# Patient Record
Sex: FEMALE | Race: WHITE | NOT HISPANIC OR LATINO | Employment: FULL TIME | URBAN - METROPOLITAN AREA
[De-identification: names, ages, dates, MRNs, and addresses within clinical notes are randomized per-mention and may not be internally consistent; named-entity substitution may affect disease eponyms.]

---

## 2023-04-11 ENCOUNTER — TELEMEDICINE (OUTPATIENT)
Dept: PRIMARY CARE | Facility: CLINIC | Age: 53
End: 2023-04-11
Payer: COMMERCIAL

## 2023-04-11 VITALS — TEMPERATURE: 100.4 F

## 2023-04-11 DIAGNOSIS — U07.1 COVID-19: Primary | ICD-10-CM

## 2023-04-11 LAB — SARS-COV-2 RESULT: DETECTED

## 2023-04-11 PROCEDURE — 99213 OFFICE O/P EST LOW 20 MIN: CPT | Performed by: NURSE PRACTITIONER

## 2023-04-11 RX ORDER — PHENAZOPYRIDINE HYDROCHLORIDE 100 MG/1
1 TABLET, FILM COATED ORAL AS NEEDED
COMMUNITY
Start: 2022-11-17

## 2023-04-11 RX ORDER — TURMERIC ROOT EXTRACT 500 MG
TABLET ORAL
COMMUNITY
Start: 2021-09-14

## 2023-04-11 RX ORDER — MAGNESIUM GLUCONATE 27.5 (500)
TABLET ORAL DAILY
COMMUNITY
Start: 2021-09-14

## 2023-04-11 RX ORDER — LEVOTHYROXINE SODIUM 137 UG/1
TABLET ORAL
COMMUNITY
End: 2023-10-16 | Stop reason: SDUPTHER

## 2023-04-11 RX ORDER — TIZANIDINE 4 MG/1
TABLET ORAL AS NEEDED
COMMUNITY
Start: 2022-10-03 | End: 2023-10-16 | Stop reason: SDUPTHER

## 2023-04-11 RX ORDER — ACETAMINOPHEN 500 MG
TABLET ORAL
COMMUNITY
Start: 2020-06-11

## 2023-04-11 NOTE — PROGRESS NOTES
An interactive audio and video telecommunication system which permits real time communications between the patient (at the originating site) and provider (at the distant site) was utilized to provide this telehealth service.  Verbal consent was requested and obtained from the patient for this telehealth visit.     Subjective   Patient ID: Sherron Valdez is a 52 y.o. female who presents for sick visit.  Symptom onset 4/9/23  Covid today 4/11/23  Went to [facility] for employee testing  Has not had any covid vaccines  Has had covid before not sure of dates  Sx  Sneezing ,coughing  watery eyes   Now body aches sore throat  Ear pain fever chills  sneezing  using  Advil cold and sinus  Allegra    Review of Systems  ROS completely negative except what was mentioned in the HPI.  Problem List, surgical, social, and family histories which were reviewed and updated as necessary within the EMR. I also personally reviewed the notes, labs, and imaging that pertained to what was documented or discussed in the HPI.      Objective   Physical Exam  Vitals and nursing note reviewed.   Constitutional:       Appearance: Normal appearance.   HENT:      Head: Normocephalic and atraumatic.      Right Ear: External ear normal.      Left Ear: External ear normal.      Nose: Nose normal.      Mouth/Throat:      Mouth: Mucous membranes are moist.   Eyes:      Extraocular Movements: Extraocular movements intact.      Conjunctiva/sclera: Conjunctivae normal.   Pulmonary:      Effort: Pulmonary effort is normal.   Musculoskeletal:      Cervical back: Normal range of motion and neck supple.   Neurological:      General: No focal deficit present.      Mental Status: She is alert and oriented to person, place, and time. Mental status is at baseline.   Psychiatric:         Mood and Affect: Mood normal.         Behavior: Behavior normal.         Thought Content: Thought content normal.         Judgment: Judgment normal.         Temp 38 °C (100.4  °F)     Assessment/Plan   Problem List Items Addressed This Visit       COVID-19 - Primary     Discussed risks and benefits to antivirals  Pt declines at this time  She desires to continue with symptomatic care  Discussed s/s that would warrant further evaluation

## 2023-04-12 PROBLEM — U07.1 COVID-19: Status: ACTIVE | Noted: 2023-04-12

## 2023-04-12 PROBLEM — Z15.09 BRCA1 POSITIVE: Status: ACTIVE | Noted: 2023-04-12

## 2023-04-12 PROBLEM — M16.12 OSTEOARTHRITIS OF LEFT HIP: Status: ACTIVE | Noted: 2023-04-12

## 2023-04-12 PROBLEM — E28.319 PREMATURE MENOPAUSE: Status: ACTIVE | Noted: 2023-04-12

## 2023-04-12 PROBLEM — M43.07 SPONDYLOLYSIS, LUMBOSACRAL REGION: Status: ACTIVE | Noted: 2023-04-12

## 2023-04-12 PROBLEM — R31.21 ASYMPTOMATIC MICROSCOPIC HEMATURIA: Status: ACTIVE | Noted: 2023-04-12

## 2023-04-12 PROBLEM — M54.6 THORACIC SPINE PAIN: Status: ACTIVE | Noted: 2023-04-12

## 2023-04-12 PROBLEM — F51.04 CHRONIC INSOMNIA: Status: ACTIVE | Noted: 2023-04-12

## 2023-04-12 PROBLEM — E55.9 VITAMIN D DEFICIENCY: Status: ACTIVE | Noted: 2023-04-12

## 2023-04-12 PROBLEM — K57.30 DIVERTICULOSIS OF COLON: Status: ACTIVE | Noted: 2023-04-12

## 2023-04-12 PROBLEM — Z98.890 S/P BREAST RECONSTRUCTION: Status: ACTIVE | Noted: 2023-04-12

## 2023-04-12 PROBLEM — Z90.13 ACQUIRED ABSENCE OF BREAST AND ABSENT NIPPLE, BILATERAL: Status: ACTIVE | Noted: 2023-04-12

## 2023-04-12 PROBLEM — Z86.0100 HISTORY OF COLON POLYPS: Status: ACTIVE | Noted: 2023-04-12

## 2023-04-12 PROBLEM — K21.9 GERD (GASTROESOPHAGEAL REFLUX DISEASE): Status: ACTIVE | Noted: 2023-04-12

## 2023-04-12 PROBLEM — E78.2 HYPERLIPIDEMIA, MIXED: Status: ACTIVE | Noted: 2023-04-12

## 2023-04-12 PROBLEM — Z15.01 BRCA1 POSITIVE: Status: ACTIVE | Noted: 2023-04-12

## 2023-04-12 PROBLEM — K64.0 GRADE I HEMORRHOIDS: Status: ACTIVE | Noted: 2023-04-12

## 2023-04-12 PROBLEM — I97.2 POSTMASTECTOMY LYMPHEDEMA SYNDROME OF LEFT UPPER EXTREMITY: Status: ACTIVE | Noted: 2023-04-12

## 2023-04-12 PROBLEM — M47.816 LUMBAR SPONDYLOSIS: Status: ACTIVE | Noted: 2023-04-12

## 2023-04-12 PROBLEM — Z85.43 H/O OVARIAN CANCER: Status: ACTIVE | Noted: 2023-04-12

## 2023-04-12 PROBLEM — E03.9 HYPOTHYROIDISM: Status: ACTIVE | Noted: 2023-04-12

## 2023-04-12 PROBLEM — N95.2 POSTMENOPAUSAL ATROPHIC VAGINITIS: Status: ACTIVE | Noted: 2023-04-12

## 2023-04-12 PROBLEM — Z85.3 HISTORY OF BREAST CANCER: Status: ACTIVE | Noted: 2023-04-12

## 2023-04-12 PROBLEM — Z86.010 HISTORY OF COLON POLYPS: Status: ACTIVE | Noted: 2023-04-12

## 2023-04-12 NOTE — ASSESSMENT & PLAN NOTE
Discussed risks and benefits to antivirals  Pt declines at this time  She desires to continue with symptomatic care  Discussed s/s that would warrant further evaluation

## 2023-05-11 PROBLEM — I71.21 ANEURYSM OF ASCENDING AORTA WITHOUT RUPTURE (CMS-HCC): Status: ACTIVE | Noted: 2023-05-11

## 2023-05-11 PROBLEM — R93.1 ABNORMAL SCREENING CARDIAC CT: Status: ACTIVE | Noted: 2023-05-11

## 2023-05-11 PROBLEM — K76.89 HEPATIC CYST: Status: ACTIVE | Noted: 2023-05-11

## 2023-06-02 PROBLEM — Z85.40 HISTORY OF PRIMARY MALIGNANT NEOPLASM OF FEMALE GENITAL ORGAN: Status: ACTIVE | Noted: 2023-04-12

## 2023-06-02 PROBLEM — N39.0 RECURRENT URINARY TRACT INFECTION: Status: ACTIVE | Noted: 2023-06-02

## 2023-06-02 PROBLEM — Z00.00 ROUTINE ADULT HEALTH MAINTENANCE: Status: ACTIVE | Noted: 2023-06-02

## 2023-06-02 PROBLEM — M43.00 SPONDYLOLYSIS: Status: ACTIVE | Noted: 2023-04-12

## 2023-06-05 PROBLEM — N28.1 KIDNEY CYST, ACQUIRED: Status: ACTIVE | Noted: 2023-06-05

## 2023-06-05 PROBLEM — M54.6 THORACIC SPINE PAIN: Status: RESOLVED | Noted: 2023-04-12 | Resolved: 2023-06-05

## 2023-10-16 ENCOUNTER — OFFICE VISIT (OUTPATIENT)
Dept: PRIMARY CARE | Facility: CLINIC | Age: 53
End: 2023-10-16
Payer: COMMERCIAL

## 2023-10-16 VITALS
HEIGHT: 61 IN | TEMPERATURE: 97.9 F | OXYGEN SATURATION: 97 % | HEART RATE: 78 BPM | BODY MASS INDEX: 31.64 KG/M2 | DIASTOLIC BLOOD PRESSURE: 86 MMHG | SYSTOLIC BLOOD PRESSURE: 130 MMHG | WEIGHT: 167.6 LBS

## 2023-10-16 DIAGNOSIS — I71.21 ANEURYSM OF ASCENDING AORTA WITHOUT RUPTURE (CMS-HCC): ICD-10-CM

## 2023-10-16 DIAGNOSIS — Z85.43 HISTORY OF OVARIAN CANCER: ICD-10-CM

## 2023-10-16 DIAGNOSIS — M43.00 SPONDYLOLYSIS: ICD-10-CM

## 2023-10-16 DIAGNOSIS — Z00.00 ROUTINE ADULT HEALTH MAINTENANCE: Primary | ICD-10-CM

## 2023-10-16 DIAGNOSIS — E55.9 VITAMIN D DEFICIENCY: ICD-10-CM

## 2023-10-16 DIAGNOSIS — M47.816 SPONDYLOSIS OF LUMBAR SPINE: ICD-10-CM

## 2023-10-16 DIAGNOSIS — E03.9 HYPOTHYROIDISM, UNSPECIFIED TYPE: ICD-10-CM

## 2023-10-16 DIAGNOSIS — M16.12 PRIMARY OSTEOARTHRITIS OF LEFT HIP: ICD-10-CM

## 2023-10-16 PROBLEM — Z98.890 S/P BREAST RECONSTRUCTION: Status: RESOLVED | Noted: 2023-04-12 | Resolved: 2023-10-16

## 2023-10-16 PROBLEM — N39.0 RECURRENT URINARY TRACT INFECTION: Status: RESOLVED | Noted: 2023-06-02 | Resolved: 2023-10-16

## 2023-10-16 PROBLEM — Z90.13 ACQUIRED ABSENCE OF BREAST AND ABSENT NIPPLE, BILATERAL: Status: RESOLVED | Noted: 2023-04-12 | Resolved: 2023-10-16

## 2023-10-16 PROBLEM — U07.1 COVID-19: Status: RESOLVED | Noted: 2023-04-12 | Resolved: 2023-10-16

## 2023-10-16 PROCEDURE — 90471 IMMUNIZATION ADMIN: CPT | Performed by: INTERNAL MEDICINE

## 2023-10-16 PROCEDURE — 90750 HZV VACC RECOMBINANT IM: CPT | Performed by: INTERNAL MEDICINE

## 2023-10-16 PROCEDURE — 99396 PREV VISIT EST AGE 40-64: CPT | Performed by: INTERNAL MEDICINE

## 2023-10-16 PROCEDURE — 1036F TOBACCO NON-USER: CPT | Performed by: INTERNAL MEDICINE

## 2023-10-16 RX ORDER — TIZANIDINE 4 MG/1
4 TABLET ORAL EVERY 8 HOURS PRN
Qty: 90 TABLET | Refills: 1 | Status: SHIPPED | OUTPATIENT
Start: 2023-10-16 | End: 2023-11-18

## 2023-10-16 RX ORDER — LEVOTHYROXINE SODIUM 137 UG/1
TABLET ORAL
Qty: 90 TABLET | Refills: 3 | Status: SHIPPED | OUTPATIENT
Start: 2023-10-16 | End: 2024-05-15

## 2023-10-16 RX ORDER — IBUPROFEN 800 MG/1
800 TABLET ORAL DAILY PRN
COMMUNITY

## 2023-10-16 ASSESSMENT — PATIENT HEALTH QUESTIONNAIRE - PHQ9
1. LITTLE INTEREST OR PLEASURE IN DOING THINGS: NOT AT ALL
SUM OF ALL RESPONSES TO PHQ9 QUESTIONS 1 AND 2: 0
2. FEELING DOWN, DEPRESSED OR HOPELESS: NOT AT ALL

## 2023-10-16 NOTE — PROGRESS NOTES
Reason for Visit: Annual Physical Exam    Assessment and Plan:  Problem List Items Addressed This Visit          High    Routine adult health maintenance - Primary    Overview     Influenza 10/1/20, declined further vaccine  TDAP 8/14/2020  Mamm 8/28/18; 5/21/20, s/p bilateral mastectomies  s/p TAHBSO  BMD 4/7/16  Cscope 12/21/14 (5yrs); 12/20 (5yrs)  MMR 3/13/19   2017 (8)         Current Assessment & Plan     Annual Wellness exam completed   Preventive Health history reviewed:  Labs ordered    Shingrix # 1           Relevant Orders    Urinalysis with Reflex Microscopic    Comprehensive Metabolic Panel    CBC and Auto Differential    Lipid Panel       Medium    Aneurysm of ascending aorta without rupture (CMS/HCC)    Overview     5/23: CT calcium score: 4.1 cm ascending aortic aneurysm.         Current Assessment & Plan     ECHO in 6months         Relevant Orders    Transthoracic Echo (TTE) Complete    History of ovarian cancer    Overview     Comment on above: Dx in 2010, stage 2 s/p TAHBSO, chemo x 6monthsDr Zanotti;         Relevant Orders        Hypothyroidism    Overview     Comment on above: Hashimoto's, dx in 1996Goal TSH 1-2She had low TSH but normal T4dose not changedOn Synthroid;  Hashimoto's, dx in 1996Goal TSH 1-2She had low TSH but normal T4dose not changedOn Synthroid (supratherapeutic on 137mcg daily);         Relevant Medications    Synthroid 137 mcg tablet    Other Relevant Orders    Transthoracic Echo (TTE) Complete    TSH with reflex to Free T4 if abnormal    Osteoarthritis of left hip    Overview     Comment on above: 2021: XR: Severe left hip osteoarthritis;         Relevant Orders    Referral to Orthopaedic Surgery    Referral to Physical Therapy    Spondylolysis    Overview     Comment on above: XR 2021: Mild anterolisthesis L5 on S1 with findings concerning forspondylolysis at L5 bilaterally;         Relevant Orders    MR lumbar spine wo IV contrast    Referral to Physical  "Therapy    Spondylosis of lumbar spine    Overview     Comment on above: MRI 4/7/16: Impression:1. Bilateral pars interarticularis defects as well as grade Ispondylolisthesis at the L5-S1 level2. Shallow central disc herniation at the L4-5 level.;  MRI 4/7/16: Impression:1. Bilateral pars interarticularis defects as well as grade I spondylolisthesis at the L5-S1 level2. Shallow central disc herniation at the L4-5 level.XR 9/21: Mild anterolisthesis L5 on S1 with findings concerning for spondylolysis at L5 bilaterally;  MRI 4/7/16: 1. Bilateral pars interarticularis defects as well as grade I spondylolisthesis at the L5-S1 level2. Shallow central disc herniation at the L4-5 level.XR 9/21: Mild anterolisthesis L5 on S1 with findings concerning for spondylolysis at L5 bilaterally;         Current Assessment & Plan     Will get imaging  May recommend PT vs Ali Young         Relevant Medications    tiZANidine (Zanaflex) 4 mg tablet    Other Relevant Orders    MR lumbar spine wo IV contrast    Referral to Physical Therapy    Vitamin D deficiency    Overview     Comment on above: Goal 40-50;  Goal 40-50started on supplement 10/21;         Relevant Orders    Vitamin D 25-Hydroxy,Total (for eval of Vitamin D levels)     HPI: Zanaflex helps her back  Didn't do imaging that was ordered ;last year  Didn't see Ortho  Hip still bother hers  Occ feels like gives way  XR last year showed severe OA    Still has pain in her left flank too  Hemp oil helping    ROS otherwise negative aside from what was mentioned above in HPI.    Vitals  /86   Pulse 78   Temp 36.6 °C (97.9 °F)   Ht 1.549 m (5' 1\")   Wt 76 kg (167 lb 9.6 oz)   SpO2 97%   BMI 31.67 kg/m²   Body mass index is 31.67 kg/m².  Physical Exam  Gen: Alert, NAD  HEENT:  Normal exam  Neck:  No masses/nodes palpable; Thyroid nontender and without nodules; No DAFNE  Respiratory:  Lungs CTAB  CV: RRR  Neuro:  Gross motor and sensory intact  Skin:  No suspicious lesions " present  Breast: No masses or axillary lymphadenopathy  Back : Tender over the left SIJ and LQ, Neg SLR  No CVA tenderness    Active Problem List  Patient Active Problem List   Diagnosis    Asymptomatic microscopic hematuria    BRCA1 positive    Chronic insomnia    Diverticulosis of colon    GERD (gastroesophageal reflux disease)    Grade I hemorrhoids    History of ovarian cancer    History of primary malignant neoplasm of female genital organ    History of colon polyps    Mixed hyperlipidemia    Hypothyroidism    Spondylosis of lumbar spine    Osteoarthritis of left hip    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 Coronary artery calcium score of 33.78*. 2. 4.1 cm ascending aortic aneurysm. 3. 2.7 cm probable cyst in the dome of the liver incidentally noted    H/O bone density study     4/7/16: normal 5/20: normal    H/O CT scan of abdomen     2011: The right hepatic lobe demonstrates a cyst measuring 4.6 cm, unchanged from multiple prior studies including CT abdomen from 714 2009. There are hypoattenuating lesions within both kidney cortices, most compatible with cysts stable from multiple prior studies, largest within upper pole right kidney measuring 2.0 cm. There is a hypoattenuating lesion measuring 0.7 cm in the lower left kidney.    H/O magnetic resonance imaging of lumbar spine     4/7/16: Impression: 1.  Bilateral pars interarticularis defects as well as grade I spondylolisthesis at the L5-S1 level 2.  Shallow central disc herniation at the L4-5 level.    H/O x-ray of hip     XR hip 2015:Mild  osteoarthrosis of the left hip    H/O x-ray of lumbar spine     : Mild anterolisthesis L5 on S1 with findings concerning for spondylolysis at L5 bilaterally     Past Surgical History:   Procedure Laterality Date    BLADDER SURGERY  2021    Lift, CARRIE     SECTION, CLASSIC  2013     Section     SECTION, CLASSIC  2013     Section    COLONOSCOPY  2020; - External and internal hemorrhoids.                     - Diverticulosis in the sigmoid colon.                     - One 7 mm polyp in the ascending colon, removed with a                     cold snare. Resected and retrieved 14 : Impression:          - The examined portion of the ileum was normal.                      - External hemorrhoids. One 5 mm polyp in the cecum.    ESOPHAGOGASTRODUODENOSCOPY  2020: Impression:         - LA Grade A reflux esophagitis.                                       - No endoscopic esophageal abnormality to explain                                       patient's dysphagia. Biopsied.                     - Non-bleeding erosive gastropathy    HYSTERECTOMY  2020    s/p TAHBSO    LYMPHADENECTOMY  2020    Pelvic LNs    OOPHORECTOMY  2013    Oophorectomy    OTHER SURGICAL HISTORY  2013    Resection Of Ovarian Malig. + BSO, Omentectomy, ABDIRASHID, LN Bx    OTHER SURGICAL HISTORY  2020    Abdominoplasty    OTHER SURGICAL HISTORY  2020    Mastectomy bilateral     Social History     Social History Narrative        [place], [place]    4 kids    Former Smoker: Smoked 8 years, 1/2ppd, Quit     Social ETOH    ---    Family History:       M: CAD age 60s/CABG, Ovarian CA age 50s        F: Esophageal CA age 50s, HTN ( age 60s)        S: Breast CA x 2 age 40s (BRCA (-)        S: Downs Syndrome, Ovarian CA with peritoneal carcinomatosis ( age 57)        B: CAD        B:  drug overdose       Allergies and  Medications  Patient has no known allergies.  Current Outpatient Medications   Medication Instructions    cholecalciferol (Vitamin D-3) 50 mcg (2,000 unit) capsule oral    fish oil concentrate (Omega-3) 120-180 mg capsule 1 capsule, oral, Daily    ibuprofen 800 mg, oral, Daily PRN    magnesium gluconate (Magonate) 27.5 mg magne- sium (500 mg) tablet oral, Daily    phenazopyridine (Pyridium) 100 mg tablet 1 tablet, oral, As needed    Synthroid 137 mcg tablet TAKE 1 TABLET BY MOUTH EVERY DAY 6 DAYS A WEEK    tiZANidine (ZANAFLEX) 4 mg, oral, Every 8 hours PRN    turmeric root extract 500 mg tablet oral

## 2023-10-16 NOTE — ASSESSMENT & PLAN NOTE
Annual Wellness exam completed   Preventive Health history reviewed:  Labs ordered    Shingrix # 1

## 2023-10-26 ENCOUNTER — LAB (OUTPATIENT)
Dept: LAB | Facility: LAB | Age: 53
End: 2023-10-26
Payer: COMMERCIAL

## 2023-10-26 DIAGNOSIS — E55.9 VITAMIN D DEFICIENCY: ICD-10-CM

## 2023-10-26 DIAGNOSIS — Z00.00 ROUTINE ADULT HEALTH MAINTENANCE: ICD-10-CM

## 2023-10-26 DIAGNOSIS — Z85.43 HISTORY OF OVARIAN CANCER: ICD-10-CM

## 2023-10-26 DIAGNOSIS — R31.21 ASYMPTOMATIC MICROSCOPIC HEMATURIA: ICD-10-CM

## 2023-10-26 DIAGNOSIS — E03.9 HYPOTHYROIDISM, UNSPECIFIED TYPE: ICD-10-CM

## 2023-10-26 DIAGNOSIS — R10.9 LEFT FLANK PAIN: Primary | ICD-10-CM

## 2023-10-26 LAB
25(OH)D3 SERPL-MCNC: 20 NG/ML (ref 30–100)
ALBUMIN SERPL BCP-MCNC: 4.3 G/DL (ref 3.4–5)
ALP SERPL-CCNC: 69 U/L (ref 33–110)
ALT SERPL W P-5'-P-CCNC: 16 U/L (ref 7–45)
ANION GAP SERPL CALC-SCNC: 9 MMOL/L (ref 10–20)
APPEARANCE UR: CLEAR
AST SERPL W P-5'-P-CCNC: 14 U/L (ref 9–39)
BASOPHILS # BLD AUTO: 0.06 X10*3/UL (ref 0–0.1)
BASOPHILS NFR BLD AUTO: 1 %
BILIRUB SERPL-MCNC: 0.4 MG/DL (ref 0–1.2)
BILIRUB UR STRIP.AUTO-MCNC: NEGATIVE MG/DL
BUN SERPL-MCNC: 14 MG/DL (ref 6–23)
CALCIUM SERPL-MCNC: 9.6 MG/DL (ref 8.6–10.3)
CANCER AG125 SERPL-ACNC: 8.8 U/ML (ref 0–30.2)
CHLORIDE SERPL-SCNC: 107 MMOL/L (ref 98–107)
CHOLEST SERPL-MCNC: 256 MG/DL (ref 0–199)
CHOLESTEROL/HDL RATIO: 4.9
CO2 SERPL-SCNC: 30 MMOL/L (ref 21–32)
COLOR UR: ABNORMAL
CREAT SERPL-MCNC: 0.74 MG/DL (ref 0.5–1.05)
EOSINOPHIL # BLD AUTO: 0.2 X10*3/UL (ref 0–0.7)
EOSINOPHIL NFR BLD AUTO: 3.2 %
ERYTHROCYTE [DISTWIDTH] IN BLOOD BY AUTOMATED COUNT: 12.5 % (ref 11.5–14.5)
GFR SERPL CREATININE-BSD FRML MDRD: >90 ML/MIN/1.73M*2
GLUCOSE SERPL-MCNC: 95 MG/DL (ref 74–99)
GLUCOSE UR STRIP.AUTO-MCNC: NEGATIVE MG/DL
HCT VFR BLD AUTO: 41.7 % (ref 36–46)
HDLC SERPL-MCNC: 52.4 MG/DL
HGB BLD-MCNC: 13.4 G/DL (ref 12–16)
IMM GRANULOCYTES # BLD AUTO: 0.02 X10*3/UL (ref 0–0.7)
IMM GRANULOCYTES NFR BLD AUTO: 0.3 % (ref 0–0.9)
KETONES UR STRIP.AUTO-MCNC: NEGATIVE MG/DL
LDLC SERPL CALC-MCNC: 183 MG/DL
LEUKOCYTE ESTERASE UR QL STRIP.AUTO: NEGATIVE
LYMPHOCYTES # BLD AUTO: 1.99 X10*3/UL (ref 1.2–4.8)
LYMPHOCYTES NFR BLD AUTO: 31.8 %
MCH RBC QN AUTO: 30.5 PG (ref 26–34)
MCHC RBC AUTO-ENTMCNC: 32.1 G/DL (ref 32–36)
MCV RBC AUTO: 95 FL (ref 80–100)
MONOCYTES # BLD AUTO: 0.55 X10*3/UL (ref 0.1–1)
MONOCYTES NFR BLD AUTO: 8.8 %
MUCOUS THREADS #/AREA URNS AUTO: ABNORMAL /LPF
NEUTROPHILS # BLD AUTO: 3.43 X10*3/UL (ref 1.2–7.7)
NEUTROPHILS NFR BLD AUTO: 54.9 %
NITRITE UR QL STRIP.AUTO: NEGATIVE
NON HDL CHOLESTEROL: 204 MG/DL (ref 0–149)
NRBC BLD-RTO: 0 /100 WBCS (ref 0–0)
PH UR STRIP.AUTO: 7 [PH]
PLATELET # BLD AUTO: 208 X10*3/UL (ref 150–450)
PMV BLD AUTO: 12.7 FL (ref 7.5–11.5)
POTASSIUM SERPL-SCNC: 5 MMOL/L (ref 3.5–5.3)
PROT SERPL-MCNC: 7.1 G/DL (ref 6.4–8.2)
PROT UR STRIP.AUTO-MCNC: NEGATIVE MG/DL
RBC # BLD AUTO: 4.39 X10*6/UL (ref 4–5.2)
RBC # UR STRIP.AUTO: ABNORMAL /UL
RBC #/AREA URNS AUTO: ABNORMAL /HPF
SODIUM SERPL-SCNC: 141 MMOL/L (ref 136–145)
SP GR UR STRIP.AUTO: 1.01
SQUAMOUS #/AREA URNS AUTO: ABNORMAL /HPF
TRIGL SERPL-MCNC: 105 MG/DL (ref 0–149)
TSH SERPL-ACNC: 1.89 MIU/L (ref 0.44–3.98)
UROBILINOGEN UR STRIP.AUTO-MCNC: <2 MG/DL
VLDL: 21 MG/DL (ref 0–40)
WBC # BLD AUTO: 6.3 X10*3/UL (ref 4.4–11.3)
WBC #/AREA URNS AUTO: ABNORMAL /HPF

## 2023-10-26 PROCEDURE — 80061 LIPID PANEL: CPT

## 2023-10-26 PROCEDURE — 84443 ASSAY THYROID STIM HORMONE: CPT

## 2023-10-26 PROCEDURE — 82306 VITAMIN D 25 HYDROXY: CPT

## 2023-10-26 PROCEDURE — 85025 COMPLETE CBC W/AUTO DIFF WBC: CPT

## 2023-10-26 PROCEDURE — 80053 COMPREHEN METABOLIC PANEL: CPT

## 2023-10-26 PROCEDURE — 81001 URINALYSIS AUTO W/SCOPE: CPT

## 2023-10-26 PROCEDURE — 36415 COLL VENOUS BLD VENIPUNCTURE: CPT

## 2023-10-26 PROCEDURE — 86304 IMMUNOASSAY TUMOR CA 125: CPT

## 2023-11-07 ENCOUNTER — DOCUMENTATION (OUTPATIENT)
Dept: GENETICS | Facility: CLINIC | Age: 53
End: 2023-11-07
Payer: COMMERCIAL

## 2023-11-07 NOTE — PROGRESS NOTES
"Cancer Genetics Chart Update:    Sherron Valdez is a 53 y.o. yo female who previously underwent genetic testing via the 34-gene CancerNext panel from [facility] in June 2020. She tested positive for a BRCA1 pathogenic mutation called EX9(partial)_10del.     Her testing also found two variants of unknown significance was identified in the BRIP1 gene called p.R814H and in the TRUDY gene called p.C8660P. This means that a change was identified on one copy of her BRIP1 gene and one copy of her TRUDY gene, but there was not enough evidence at that time to know whether these changes are harmless (not associated with an increased risk to develop cancer) or harmful (associated with an increased risk to develop cancer).     On December 4, 2020, [facility] issued an amended report, which now classifies the variant of unknown significance in the BRIP1 gene as \"likely benign\". Ms. Valdez still carries that change in the BRIP1 gene, but it is now thought to be unlikely to be associated with an increased risk to develop cancer.    On November 2, 2023, [facility] issued an amended report, which now classifies the variant of unknown significance in the TRUDY gene as \"likely benign\". Ms. Valdez still carries that change in the TRUDY gene, but it is now thought to be unlikely to be associated with an increased risk to develop cancer.     Ms. Valdez also has the harmful change in the BRCA1 gene. She should continue with care recommendations based on carrying a pathogenic BRCA1 mutation, as previously discussed with her by genetic counselor, Kirstin Schmidt. Her relatives are still recommended to have testing of the BRCA1 gene to better understand their cancer risk.    Ms. Valdez will be mailed a copy of the reclassification notice and this chart update for her records. We remain available to Ms. Valdez and her family members at [phone] with any additional questions.     Diamond Wilkes MS, [facility]  Genetic Counselor  Center for " Human Genetics  [phone]     Reviewed by:  Mary Colin MD, PhD  Clinical    Genetics and Genome Sciences and Pediatrics  [facility]

## 2023-11-18 DIAGNOSIS — M47.816 SPONDYLOSIS OF LUMBAR SPINE: ICD-10-CM

## 2023-11-18 RX ORDER — TIZANIDINE 4 MG/1
4 TABLET ORAL EVERY 8 HOURS PRN
Qty: 270 TABLET | Refills: 1 | Status: SHIPPED | OUTPATIENT
Start: 2023-11-18

## 2024-01-18 ENCOUNTER — OFFICE VISIT (OUTPATIENT)
Dept: OBSTETRICS AND GYNECOLOGY | Facility: CLINIC | Age: 54
End: 2024-01-18
Payer: COMMERCIAL

## 2024-01-18 VITALS
DIASTOLIC BLOOD PRESSURE: 90 MMHG | BODY MASS INDEX: 31.34 KG/M2 | WEIGHT: 166 LBS | HEART RATE: 71 BPM | HEIGHT: 61 IN | SYSTOLIC BLOOD PRESSURE: 161 MMHG

## 2024-01-18 DIAGNOSIS — N39.0 ACUTE LOWER UTI: ICD-10-CM

## 2024-01-18 DIAGNOSIS — R32 URINARY INCONTINENCE, UNSPECIFIED TYPE: Primary | ICD-10-CM

## 2024-01-18 DIAGNOSIS — R31.1 BENIGN ESSENTIAL MICROSCOPIC HEMATURIA: ICD-10-CM

## 2024-01-18 LAB
POC APPEARANCE, URINE: CLEAR
POC BILIRUBIN, URINE: NEGATIVE
POC BLOOD, URINE: ABNORMAL
POC COLOR, URINE: YELLOW
POC GLUCOSE, URINE: NEGATIVE MG/DL
POC KETONES, URINE: NEGATIVE MG/DL
POC LEUKOCYTES, URINE: NEGATIVE
POC NITRITE,URINE: NEGATIVE
POC PH, URINE: 5.5 PH
POC PROTEIN, URINE: NEGATIVE MG/DL
POC SPECIFIC GRAVITY, URINE: 1.01
POC UROBILINOGEN, URINE: 0.2 EU/DL

## 2024-01-18 PROCEDURE — 87086 URINE CULTURE/COLONY COUNT: CPT | Performed by: OBSTETRICS & GYNECOLOGY

## 2024-01-18 PROCEDURE — 51798 US URINE CAPACITY MEASURE: CPT | Performed by: OBSTETRICS & GYNECOLOGY

## 2024-01-18 PROCEDURE — 99204 OFFICE O/P NEW MOD 45 MIN: CPT | Performed by: OBSTETRICS & GYNECOLOGY

## 2024-01-18 PROCEDURE — 1036F TOBACCO NON-USER: CPT | Performed by: OBSTETRICS & GYNECOLOGY

## 2024-01-18 PROCEDURE — 99214 OFFICE O/P EST MOD 30 MIN: CPT | Performed by: OBSTETRICS & GYNECOLOGY

## 2024-01-18 ASSESSMENT — PAIN SCALES - GENERAL: PAINLEVEL: 0-NO PAIN

## 2024-01-18 NOTE — PROGRESS NOTES
Urogynecology  Provider:  Cari Elmore MD  [phone]              ASSESSMENT AND PLAN:     Problem List Items Addressed This Visit    None          I spent a total of 45 minutes in face to face and non face to face time.      Cari Elmore MD  HISTORY OF PRESENT ILLNESS    Gutierrez Tse a 53 y.o. here today for a follow up     Urinary symptoms:  - Reports a lot of bladder spasms recently  - Reports three spasms in the last couple months   - Takes Pyridium and Macrobid when her bladder flares   - Reports some blood in her urine which is very odd   - Denies UI    Bowel symptoms:  -  Reports two blood clots when passing a bowel movement     Interval history:  - Reports a bilateral mastectomy for breast cancer since last visit      Gutierrez is a former patient who has not been seen in many years.  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 Coronary artery calcium score of 33.78*. 2. 4.1 cm ascending aortic aneurysm. 3. 2.7 cm probable cyst in the dome of the liver incidentally noted    H/O bone density study     16: normal : normal    H/O CT scan of abdomen     : The right hepatic lobe demonstrates a cyst measuring 4.6 cm, unchanged from multiple prior studies including CT abdomen from 2009. There are hypoattenuating lesions within both kidney cortices, most compatible with cysts stable from multiple prior studies, largest within upper pole right kidney measuring 2.0 cm. There is a hypoattenuating lesion measuring 0.7 cm in the lower left kidney.    H/O magnetic resonance imaging of lumbar spine     16: Impression: 1.  Bilateral pars interarticularis defects as well as grade I spondylolisthesis at the L5-S1 level 2.  Shallow central disc herniation at the L4-5 level.    H/O  x-ray of hip     XR hip :Mild osteoarthrosis of the left hip    H/O x-ray of lumbar spine     : Mild anterolisthesis L5 on S1 with findings concerning for spondylolysis at L5 bilaterally          Past Surgical History:       Past Surgical History:   Procedure Laterality Date    BLADDER SURGERY  2021    Lift, CARRIE     SECTION, CLASSIC  2013     Section     SECTION, CLASSIC  2013     Section    COLONOSCOPY  2020; - External and internal hemorrhoids.                     - Diverticulosis in the sigmoid colon.                     - One 7 mm polyp in the ascending colon, removed with a                     cold snare. Resected and retrieved 14 : Impression:          - The examined portion of the ileum was normal.                      - External hemorrhoids. One 5 mm polyp in the cecum.    ESOPHAGOGASTRODUODENOSCOPY  2020: Impression:         - LA Grade A reflux esophagitis.                                       - No endoscopic esophageal abnormality to explain                                       patient's dysphagia. Biopsied.                     - Non-bleeding erosive gastropathy    HYSTERECTOMY  2020    s/p TAHBSO    LYMPHADENECTOMY  2020    Pelvic LNs    OOPHORECTOMY  2013    Oophorectomy    OTHER SURGICAL HISTORY  2013    Resection Of Ovarian Malig. + BSO, Omentectomy, ABDIRASHID, LN Bx    OTHER SURGICAL HISTORY  2020    Abdominoplasty    OTHER SURGICAL HISTORY  2020    Mastectomy bilateral         Medications:       Prior to Admission medications    Medication Sig Start Date End Date Taking? Authorizing Provider   cholecalciferol (Vitamin D-3) 50 mcg (2,000 unit) capsule Take by mouth. 20   Historical Provider, MD   fish oil concentrate (Omega-3) 120-180 mg capsule Take 1 capsule (1 g) by mouth once daily. 21   Historical Provider, MD   ibuprofen 800 mg tablet Take 1 tablet (800 mg) by  mouth once daily as needed for mild pain (1 - 3) or moderate pain (4 - 6).    Historical Provider, MD   magnesium gluconate (Magonate) 27.5 mg magne- sium (500 mg) tablet Take by mouth once daily. 9/14/21   Historical Provider, MD   phenazopyridine (Pyridium) 100 mg tablet Take 1 tablet (100 mg) by mouth if needed. 11/17/22   Historical Provider, MD   Synthroid 137 mcg tablet TAKE 1 TABLET BY MOUTH EVERY DAY 6 DAYS A WEEK 10/16/23   Danuta Hendrix MD   tiZANidine (Zanaflex) 4 mg tablet TAKE 1 TABLET (4 MG) BY MOUTH EVERY 8 HOURS IF NEEDED FOR MUSCLE SPASMS. 11/18/23   Danuta Hendrix MD   turmeric root extract 500 mg tablet Take by mouth. 9/14/21   Historical Provider, MD CHEUNG  Review of Systems   Gastrointestinal:         Hemorrhoids          PHYSICAL EXAM:      There were no vitals taken for this visit.     No LMP recorded.      Declines chaperone for physical exam.    PVR=     Well developed, well nourished, in no apparent distress.   Neurologic/Psychiatric:  Awake, Alert and Oriented times 3.  Affect normal. Normal cranial nerves  Pulm: breathing without effort  Sexual maturity: Fred stage V  Abd exam: soft, non-tender      GENITAL/URINARY:       External Genitalia:  The patient has normal appearing external genitalia, normal skenes and bartholins glands, and a normal hair distribution.  Her vulva is without lesions, erythema or discharge.  It is non-tender with appropriate sensation.     Urethral Meatus:  Size normal, Location normal, Lesions absent, Prolapse absent,      Urethra:  Fullness absent, Masses absent,      Bladder:  Fullness absent, Masses absent, Tenderness absent,      Vagina:  General appearance normal, Estrogen effect normal, Discharge absent, Lesions absent, well position sling No mesh erosion    Cervix: absent   Uterus:  surgically absent  Adnexa:   wnl    Anus/Perineum:  Lesions absent and Masses absent normal sphincter tone, no lesions  Normal Perineum    POP-Q:  Aa: -1       Ba:  C:  -9   Gh:  Pb:  TVL: 9         Ap: -1 Bp:  D: x           She does not have myofascial tenderness on exam.     Rectal exam: wnl.     ASSESSMENT &PLAN     Assessment:   53 y.o. old female being assessed for hematuria and MAI    Diagnoses:   #1 Hematuria  #2 MAI    Plan:   Hematuria  - UA today showed small blood  - We discussed the hematuria work-up to include cystoscopy and kidney US  - Pt amendable to this plan  - renal US ordered  - Cystoscopy to be done at [place] when Dr. Elmore is there  - Standing urine culture ordered placed     2. MAI  - S/P sling placement  - No evidence of MAI by clinical exam and subjectively per the patient.      Follow-up with Dr. Elmore for cystoscopy at [place]     Scribe Attestation  By signing my name below, IKimberly , Scribe   attest that this documentation has been prepared under the direction and in the presence of Cari Elmore MD.         Cari Elmore MD

## 2024-01-19 LAB — BACTERIA UR CULT: NORMAL

## 2024-01-19 ASSESSMENT — ENCOUNTER SYMPTOMS: ROS GI COMMENTS: HEMORRHOIDS

## 2024-01-24 ENCOUNTER — TELEPHONE (OUTPATIENT)
Dept: OBSTETRICS AND GYNECOLOGY | Facility: CLINIC | Age: 54
End: 2024-01-24
Payer: COMMERCIAL

## 2024-01-24 NOTE — TELEPHONE ENCOUNTER
Pt called to schedule kidney u/s but has no order. The office note indicates possible pelvic u/s, but pt was seen for hematuria. Will double check with STM if she also needs a pelvic u/s.

## 2024-02-13 DIAGNOSIS — Z23 NEED FOR VACCINATION: ICD-10-CM

## 2024-02-16 ENCOUNTER — APPOINTMENT (OUTPATIENT)
Dept: PRIMARY CARE | Facility: CLINIC | Age: 54
End: 2024-02-16
Payer: COMMERCIAL

## 2024-02-16 ENCOUNTER — CLINICAL SUPPORT (OUTPATIENT)
Dept: PRIMARY CARE | Facility: CLINIC | Age: 54
End: 2024-02-16
Payer: COMMERCIAL

## 2024-02-16 DIAGNOSIS — Z23 NEED FOR VACCINATION: ICD-10-CM

## 2024-02-16 PROCEDURE — 90750 HZV VACC RECOMBINANT IM: CPT | Performed by: INTERNAL MEDICINE

## 2024-02-16 PROCEDURE — 90471 IMMUNIZATION ADMIN: CPT | Performed by: INTERNAL MEDICINE

## 2024-02-19 ENCOUNTER — TELEPHONE (OUTPATIENT)
Dept: OBSTETRICS AND GYNECOLOGY | Facility: CLINIC | Age: 54
End: 2024-02-19
Payer: COMMERCIAL

## 2024-02-19 DIAGNOSIS — R31.21 ASYMPTOMATIC MICROSCOPIC HEMATURIA: Primary | ICD-10-CM

## 2024-02-19 NOTE — TELEPHONE ENCOUNTER
Order for renal ultrasound entered per office note. Left message informing pt she can now schedule it.   Detail Level: Detailed

## 2024-02-23 ENCOUNTER — HOSPITAL ENCOUNTER (OUTPATIENT)
Dept: RADIOLOGY | Facility: CLINIC | Age: 54
Discharge: HOME | End: 2024-02-23
Payer: COMMERCIAL

## 2024-02-23 DIAGNOSIS — R31.21 ASYMPTOMATIC MICROSCOPIC HEMATURIA: ICD-10-CM

## 2024-02-23 PROCEDURE — 76770 US EXAM ABDO BACK WALL COMP: CPT

## 2024-02-23 PROCEDURE — 76770 US EXAM ABDO BACK WALL COMP: CPT | Performed by: RADIOLOGY

## 2024-02-27 ENCOUNTER — PROCEDURE VISIT (OUTPATIENT)
Dept: OBSTETRICS AND GYNECOLOGY | Facility: CLINIC | Age: 54
End: 2024-02-27
Payer: COMMERCIAL

## 2024-02-27 VITALS
SYSTOLIC BLOOD PRESSURE: 124 MMHG | HEIGHT: 61 IN | BODY MASS INDEX: 32.1 KG/M2 | DIASTOLIC BLOOD PRESSURE: 82 MMHG | WEIGHT: 170 LBS

## 2024-02-27 DIAGNOSIS — R31.21 ASYMPTOMATIC MICROSCOPIC HEMATURIA: Primary | ICD-10-CM

## 2024-02-27 DIAGNOSIS — R31.1 BENIGN ESSENTIAL MICROSCOPIC HEMATURIA: ICD-10-CM

## 2024-02-27 LAB
POC APPEARANCE, URINE: CLEAR
POC BILIRUBIN, URINE: NEGATIVE
POC BLOOD, URINE: ABNORMAL
POC COLOR, URINE: YELLOW
POC GLUCOSE, URINE: NEGATIVE MG/DL
POC KETONES, URINE: NEGATIVE MG/DL
POC LEUKOCYTES, URINE: NEGATIVE
POC NITRITE,URINE: NEGATIVE
POC PH, URINE: 6.5 PH
POC PROTEIN, URINE: NEGATIVE MG/DL
POC SPECIFIC GRAVITY, URINE: 1.01
POC UROBILINOGEN, URINE: 0.2 EU/DL

## 2024-02-27 PROCEDURE — 52000 CYSTOURETHROSCOPY: CPT | Performed by: OBSTETRICS & GYNECOLOGY

## 2024-02-27 PROCEDURE — 99213 OFFICE O/P EST LOW 20 MIN: CPT | Performed by: OBSTETRICS & GYNECOLOGY

## 2024-02-27 PROCEDURE — 81003 URINALYSIS AUTO W/O SCOPE: CPT | Performed by: OBSTETRICS & GYNECOLOGY

## 2024-02-27 NOTE — PROGRESS NOTES
Chief Complaint    Patient here for cystoscopy .    HPI:  Pt here for cystoscopy to complete hematuria work up.     Last seen 1/19/24  Diagnoses:   #1 Hematuria  #2 MAI     Plan:   Hematuria  - UA today showed small blood  - We discussed the hematuria work-up to include cystoscopy and kidney US  - Pt amendable to this plan  - renal US was normal 2/2024  - Cystoscopy to be done at [facility] when Dr. Elmore is there  - Standing urine culture ordered placed      2. MAI  - S/P sling placement  - No evidence of MAI by clinical exam and subjectively per the patient.      Follow-up with Dr. Elmore for cystoscopy at [facility]     Procedure:   The patient gave a urine sample which was checked for infection and found to be negative.  Pt was numbed for 20 min with lidojet inserted in to the bladder and given 100 mg po pyridium.    The patient was consented for cytoscopy.     Using a flexible scope cystoscopy was performed. The entire bladder was visualized including the ureteral orfice and the internal urethal orfice.  No masses, stones or foreign bodies were noted. No bleeding was noted    The procedure was completed, the patient allowed to empty her bladder and get dressed.    Post-procedure precautions were given to the patient and was given a dose of macrodantin 100 mg po x 1    STM     Assessment and Plan:  53 year old female with hx of MAI s/p TVT placement and AMH. Comorbidities include: GERD, BRCA1 genetic mutation, and hx of ovarian cancer.     Diagnoses:  #1 Asymptomatic microscopic hematuria     Plan:   1. AMH  - POCT UA today with moderate blood.   - Reviewed renal U/S from 2/23/2024 which was unremarkable and negative for kidney stones and hydronephrosis.   - We discussed the risks of the cystoscopy including UTI, dysuria, and hematuria. If she feels symptomatic of a UTI we advised her to call our office. There is a very small risk of injury to the bladder or urethra due to the cystoscope. We counseled that is a  risk that there could be abnormal cells that are not visible to the naked eye that we are unable to detect by cystoscopy today. If we find something abnormal or suspicious we may take a biopsy of the mass or fluid and we would refer her to urology. The patient provided verbal understanding and signed her consent prior to the procedure today.   - Cystoscopy today demonstrated a normal bladder lining and mucosa, normal bladder neck upon retroflexion, normal urethra, and bilateral orthotopic ureters. No evidence of bladder polyps, stones, or masses were visualized. No foreign body. Unremarkable cystoscopy.   - Gave her Macrobid 100mg 1 pill po today for UTI ppx post-procedure.     Follow up in 1 year with Dr. Elmore.     Scribe Attestation  By signing my name below, I, Elsi Bardales, attest that this documentation has been prepared under the direction and in the presence of Cari Elmore MD on 02/27/2024 at 3:05 PM.     Agree with above. I Dr. Elmore, personally performed the services described in the documentation which was scribed virtually and confirm it is both complete and accurate.  Cari Elmore MD

## 2024-03-04 ENCOUNTER — APPOINTMENT (OUTPATIENT)
Dept: OBSTETRICS AND GYNECOLOGY | Facility: CLINIC | Age: 54
End: 2024-03-04
Payer: COMMERCIAL

## 2024-03-18 ENCOUNTER — APPOINTMENT (OUTPATIENT)
Dept: OBSTETRICS AND GYNECOLOGY | Facility: CLINIC | Age: 54
End: 2024-03-18
Payer: COMMERCIAL

## 2024-05-01 ENCOUNTER — OFFICE VISIT (OUTPATIENT)
Dept: ORTHOPEDIC SURGERY | Facility: CLINIC | Age: 54
End: 2024-05-01
Payer: COMMERCIAL

## 2024-05-01 ENCOUNTER — HOSPITAL ENCOUNTER (OUTPATIENT)
Dept: RADIOLOGY | Facility: CLINIC | Age: 54
Discharge: HOME | End: 2024-05-01
Payer: COMMERCIAL

## 2024-05-01 DIAGNOSIS — M16.12 PRIMARY OSTEOARTHRITIS OF LEFT HIP: ICD-10-CM

## 2024-05-01 DIAGNOSIS — M47.816 LUMBAR SPONDYLOSIS: ICD-10-CM

## 2024-05-01 DIAGNOSIS — M47.816 LUMBAR SPONDYLOSIS: Primary | ICD-10-CM

## 2024-05-01 DIAGNOSIS — M99.53 INTERVERTEBRAL DISC STENOSIS OF NEURAL CANAL OF LUMBAR REGION: ICD-10-CM

## 2024-05-01 PROCEDURE — 72110 X-RAY EXAM L-2 SPINE 4/>VWS: CPT | Performed by: RADIOLOGY

## 2024-05-01 PROCEDURE — 99204 OFFICE O/P NEW MOD 45 MIN: CPT | Performed by: PHYSICAL MEDICINE & REHABILITATION

## 2024-05-01 PROCEDURE — 1036F TOBACCO NON-USER: CPT | Performed by: PHYSICAL MEDICINE & REHABILITATION

## 2024-05-01 PROCEDURE — 73502 X-RAY EXAM HIP UNI 2-3 VIEWS: CPT | Mod: LT

## 2024-05-01 PROCEDURE — 72110 X-RAY EXAM L-2 SPINE 4/>VWS: CPT

## 2024-05-01 PROCEDURE — 73502 X-RAY EXAM HIP UNI 2-3 VIEWS: CPT | Mod: LEFT SIDE | Performed by: RADIOLOGY

## 2024-05-01 PROCEDURE — 99214 OFFICE O/P EST MOD 30 MIN: CPT | Performed by: PHYSICAL MEDICINE & REHABILITATION

## 2024-05-01 RX ORDER — MELOXICAM 7.5 MG/1
7.5 TABLET ORAL 2 TIMES DAILY PRN
Qty: 30 TABLET | Refills: 1 | Status: SHIPPED | OUTPATIENT
Start: 2024-05-01 | End: 2025-05-01

## 2024-05-01 SDOH — SOCIAL STABILITY: SOCIAL NETWORK: SOCIAL ACTIVITY:: 8

## 2024-05-01 NOTE — PROGRESS NOTES
New Consult/New Patient Note    5/1/2024   Referred by: No ref. provider found    Assessment:  This is a pleasant 54 y.o. female who presents with chronic low back pain with groin pain that has gotten worse over the past couple weeks.  Reviewing old x-rays of the lumbar spine and hips from 2021 showed L5 on S1 anterolisthesis with degenerative changes at that level along with severe osteoarthritis in the left hip.  Exam is reassuring for lack of neurologic compromise but does show severely limited range of motion in both hips with left worse than right.  -Bilateral hip osteoarthritis-left worse than right  -Lumbar spondylosis with anterolisthesis of L5 and S1  -Lumbar stenosis    PLAN:  1)  Imaging/Diagnostic Studies: Reviewed images of lumbar spine and bilateral hips showing anterolisthesis of L5 on S1 and severe osteoarthritis in the left hip.  Ordered new x-rays of both hips and lumbar spine.  2)  Therapy/Rehabilitation: Sent referral for PT to work on core and hip strengthening  3)  Pharmacological Management: Ordered meloxicam 7.5mg as needed to take instead of motrin. Can continue tizanidine as needed  4)  Spine/Surgical Interventions:  Will move forward with left hip intraarticular injection with fluoroscopic guidance-this will be therapeutic  5)  Alternative Treatments: May consider alternative treatment options in the future including manipulation (chiropractor versus osteopathic) and/or acupuncture if patient does not obtain optimal relief with initial treatment plan.  6)  Consultations: Physical therapy  7)  Follow -up: 6-8 weeks or PRN if symptoms worsen/do not improve.   8)  Future treatment considerations: Consider MRI of the lumbar and/or hips, intra-articular hip injection on the right side, or UMM vs intraarticular facet injection at the L5-S1 level    Patient advised of the difference between hurt and harm and advised to continue with all normal activities and exercises. Patient verbalized  understanding of the above plan and was happy with the care provided.      The above clinical summary has been dictated with voice recognition software. It has not been proofread for grammatical errors, typographical mistakes, or other semantic inconsistencies.    Thank you for visiting our office today. It was our pleasure to take part in your healthcare.     Do not hesitate to call with any questions regarding your plan of care after leaving at [phone]    To clinicians, thank you very much for this kind referral. It is a privilege to partner with you in the care of your patients. My office would be delighted to assist you with any further consultations or with questions regarding the plan of care outlined. Do not hesitate to call the office or contact me directly.    Reji Huggins DO, ATC   Physical Medicine and Rehabilitation PGY-3    Sincerely,    FARZANA Kay MD  , Physical Medicine and Rehabilitation, Orthopedic Spine  [facility] [employer] [facility]  [facility] [place]     Seen with resident Dr. Huggins, note above and below is a joint effort in all areas.    I saw and evaluated the patient. I personally obtained the key and critical portions of the history and physical exam. I reviewed the resident's documentation and discussed the patient with the resident. I agree with the resident's medical decision making as documented in the resident's note, with my additions.      Sherron Valdez is a 54 y.o. female who presents with chronic low back pain with groin pain. Started about 20 years ago when she through her back out.  For years her left hip would feel worse with feeling like it is going to give out and now over the past couple weeks she feels like her right leg and hip has gotten worse.  She denies numbness, tingling, bowel/bladder incontinence, or saddle anesthesia.    Location: pain in bilateral groin and low back around the L5-S1  level  Radiation: radiates as far as her left ankle occasionally that feels like a throbbing pain  Quality:  dull ache current 5/10,  at its worst  8/10  Exacerbated by going from sitting to standing  Relieved by Motrin 800 mg BID  Onset, traumatic event: none  Has tried: tylenol didn't help, gabapentin didn't like the side effects, tizanidine helps     Valsalva sign is positive  Grocery cart sign is positive   Smoker:  no  Does not wake them at night  Litigation: no    Patient denies bowel/bladder incontinence, denies fever, denies unintentional weight loss, denies clumsiness of hands, feet, or dropping things.  Denies any constitutional or myelopathic symptomatology.      PREVIOUS TREATMENTS  IN THE LAST SIX MONTHS     Active conservative therapy  in the last six months (see below)              1. Physical therapy:  none recently                                                                                   2. Home exercise program after PT:  no                                                    3. A physician supervised home exercise program (HEP): no                 4. Chiropractic Care:  no                                                                    Passive conservative therapy  in the last six months (see below)              1. NSAIDS: Motrin                                                                                                          2. Prescription pain medication: tizanidine at night                                                             3. Acupuncture: no                                                                                            4. Tens unit:  in the past she has liked it     Assistive Devices: no    Work status: Works as a nurse in        ROS: Other than listed in HPI, PMHX below, and intake paperwork including a 30 point patient-recorded review of symptoms which was personally reviewed and inclusive of no history of unintentional weight loss, change in  appetite, significant malaise, fevers, chills, or change in bowel/bladder, shortness of breath, or chest pain.    I have confirmed and edited as necessary Past Medical, Past Surgical, Family, Social History and ROS as obtained by others. These were also obtained on new patient forms.      PHYSICAL EXAM:   GENERAL APPEARANCE:  Well nourished, well developed, and no apparent distress.  NEURO PSYCH: Patient oriented to person, place, Mood pleasant. Benign affect.  MUSCULOSKELETAL and NEUROLOGICAL       VISUAL INSPECTION           LUMBAR: WNL  SPINE ROM:   LUMBAR ROM: Limited in extension with pain      PALPATION:           SPINOUS PROCESS: No tenderness           PARASPINALS: No tenderness  FACET LOADING: No pain  MUSCLE BULK: Normal and symmetrical in the upper & lower extremities.  MUSCLE TONE: Normal  MOTOR: 5/5 in all muscle groups tested in bilateral lower extremities   SENSORY: Normal sensory exam to light touch  GAIT: Slow to stand up due to stiffness and pain and will walk with very stiff hips and limited motion in flexion or extension.  Able to go up and heels and toes with no sig. weakness.  No sig. balance deficit appreciated  REFLEXES: +3 to bilateral upper and lower extremities  LONG TRACT SIGNS: No clonus  STRAIGHT LEG TEST: Negative  PERIPHERAL JOINT ROM:   HIP ROM: Limited in all directions with worse being in internal rotation  TERRY/Thigh Thrust/Compression/Jose Finger:  Negative bilaterally   Hip Exam including thigh thrust and LOG ROLL: Positive bilaterally    DATA REVIEW:   The below imaging studies were personally reviewed and discussed with the patient.    Medical Decision Making:  The above note constitutes a Moderate to High level of medical decision making based on past data and imaging review, new and chronic symptoms with exacerbation, change in weakness or sensation, new imaging and diagnostic studies ordered, discussion of potential interventional or surgical treatment options, acute or  chronic pain that may pose a threat to bodily function.    Past Medical History:   Diagnosis Date    Abnormal screening cardiac CT     5/23: 1. Coronary artery calcium score of 33.78*. 2. 4.1 cm ascending aortic aneurysm. 3. 2.7 cm probable cyst in the dome of the liver incidentally noted    H/O bone density study     4/7/16: normal 5/20: normal    H/O CT scan of abdomen     2011: The right hepatic lobe demonstrates a cyst measuring 4.6 cm, unchanged from multiple prior studies including CT abdomen from 714 2009. There are hypoattenuating lesions within both kidney cortices, most compatible with cysts stable from multiple prior studies, largest within upper pole right kidney measuring 2.0 cm. There is a hypoattenuating lesion measuring 0.7 cm in the lower left kidney.    H/O magnetic resonance imaging of lumbar spine     4/7/16: Impression: 1.  Bilateral pars interarticularis defects as well as grade I spondylolisthesis at the L5-S1 level 2.  Shallow central disc herniation at the L4-5 level.    H/O x-ray of hip     XR hip 2015:Mild osteoarthrosis of the left hip    H/O x-ray of lumbar spine     9/21: Mild anterolisthesis L5 on S1 with findings concerning for spondylolysis at L5 bilaterally       Medication Documentation Review Audit       Reviewed by Danuta Hendrix MD (Physician) on 10/16/23 at 1135      Medication Order Taking? Sig Documenting Provider Last Dose Status   cholecalciferol (Vitamin D-3) 50 mcg (2,000 unit) capsule [id number] Yes Take by mouth. Historical Provider, MD Taking Active   fish oil concentrate (Omega-3) 120-180 mg capsule [id number] Yes Take 1 capsule (1 g) by mouth once daily. Historical Provider, MD Taking Active   ibuprofen 800 mg tablet [id number]  Take 1 tablet (800 mg) by mouth once daily as needed for mild pain (1 - 3) or moderate pain (4 - 6). Historical Provider, MD  Active   magnesium gluconate (Magonate) 27.5 mg magne- sium (500 mg) tablet [id number] Yes Take by mouth once daily.  Historical Provider, MD Taking Active   phenazopyridine (Pyridium) 100 mg tablet [id number] Yes Take 1 tablet (100 mg) by mouth if needed. Historical Provider, MD Taking Active   Synthroid 137 mcg tablet [id number] Yes TAKE 1 TABLET BY MOUTH EVERY DAY 6 DAYS A WEEK Historical Provider, MD Taking Active   tiZANidine (Zanaflex) 4 mg tablet [id number] Yes Take by mouth if needed. Historical Provider, MD Taking Active   turmeric root extract 500 mg tablet [id number] Yes Take by mouth. Historical Provider, MD Taking Active                    No Known Allergies    Social History     Socioeconomic History    Marital status:      Spouse name: Not on file    Number of children: Not on file    Years of education: Not on file    Highest education level: Not on file   Occupational History    Not on file   Tobacco Use    Smoking status: Former     Types: Cigarettes    Smokeless tobacco: Never   Substance and Sexual Activity    Alcohol use: Yes     Comment: rarely    Drug use: Never    Sexual activity: Not on file   Other Topics Concern    Not on file   Social History Narrative        RN, [employer] and [employer] in [place]    4 kids    Former Smoker: Smoked 8 years, 1/2ppd, Quit     Social ETOH    ---    Family History:       M: CAD age 60s/CABG, Ovarian CA age 50s        F: Esophageal CA age 50s, HTN ( age 60s)        S: Breast CA x 2 age 40s (BRCA (-)        S: Downs Syndrome, Ovarian CA with peritoneal carcinomatosis ( age 57)        B: CAD        B:  drug overdose     Social Determinants of Health     Financial Resource Strain: Not on file   Food Insecurity: Not on file   Transportation Needs: Not on file   Physical Activity: Not on file   Stress: Not on file   Social Connections: Not on file   Intimate Partner Violence: Not on file   Housing Stability: Not on file       Past Surgical History:   Procedure Laterality Date    BLADDER SURGERY  2021    Lift, CARRIE     SECTION, CLASSIC  2013      Section     SECTION, CLASSIC  2013     Section    COLONOSCOPY  2020; - External and internal hemorrhoids.                     - Diverticulosis in the sigmoid colon.                     - One 7 mm polyp in the ascending colon, removed with a                     cold snare. Resected and retrieved 14 : Impression:          - The examined portion of the ileum was normal.                      - External hemorrhoids. One 5 mm polyp in the cecum.    ESOPHAGOGASTRODUODENOSCOPY  2020: Impression:         - LA Grade A reflux esophagitis.                                       - No endoscopic esophageal abnormality to explain                                       patient's dysphagia. Biopsied.                     - Non-bleeding erosive gastropathy    HYSTERECTOMY  2020    s/p TAHBSO    LYMPHADENECTOMY  2020    Pelvic LNs    OOPHORECTOMY  2013    Oophorectomy    OTHER SURGICAL HISTORY  2013    Resection Of Ovarian Malig. + BSO, Omentectomy, ABDIRASHID, LN Bx    OTHER SURGICAL HISTORY  2020    Abdominoplasty    OTHER SURGICAL HISTORY  2020    Mastectomy bilateral

## 2024-05-14 DIAGNOSIS — E03.9 HYPOTHYROIDISM, UNSPECIFIED TYPE: ICD-10-CM

## 2024-05-15 RX ORDER — LEVOTHYROXINE SODIUM 137 UG/1
TABLET ORAL
Qty: 90 TABLET | Refills: 3 | Status: SHIPPED | OUTPATIENT
Start: 2024-05-15

## 2024-05-17 ENCOUNTER — APPOINTMENT (OUTPATIENT)
Dept: OPERATING ROOM | Facility: CLINIC | Age: 54
End: 2024-05-17
Payer: COMMERCIAL

## 2024-05-30 ENCOUNTER — APPOINTMENT (OUTPATIENT)
Dept: ORTHOPEDIC SURGERY | Facility: CLINIC | Age: 54
End: 2024-05-30
Payer: COMMERCIAL

## 2024-06-20 ENCOUNTER — APPOINTMENT (OUTPATIENT)
Dept: ORTHOPEDIC SURGERY | Facility: CLINIC | Age: 54
End: 2024-06-20
Payer: COMMERCIAL

## 2024-09-16 DIAGNOSIS — M47.816 LUMBAR SPONDYLOSIS: ICD-10-CM

## 2024-09-16 DIAGNOSIS — M16.12 PRIMARY OSTEOARTHRITIS OF LEFT HIP: ICD-10-CM

## 2024-09-16 RX ORDER — MELOXICAM 7.5 MG/1
7.5 TABLET ORAL 2 TIMES DAILY PRN
Qty: 30 TABLET | Refills: 1 | OUTPATIENT
Start: 2024-09-16 | End: 2025-09-16

## 2024-11-06 DIAGNOSIS — M16.12 PRIMARY OSTEOARTHRITIS OF LEFT HIP: ICD-10-CM

## 2024-11-06 DIAGNOSIS — M47.816 LUMBAR SPONDYLOSIS: ICD-10-CM

## 2024-11-06 RX ORDER — MELOXICAM 7.5 MG/1
7.5 TABLET ORAL 2 TIMES DAILY PRN
Qty: 30 TABLET | Refills: 1 | Status: SHIPPED | OUTPATIENT
Start: 2024-11-06 | End: 2025-11-06

## 2024-11-18 ENCOUNTER — APPOINTMENT (OUTPATIENT)
Dept: PRIMARY CARE | Facility: CLINIC | Age: 54
End: 2024-11-18
Payer: COMMERCIAL

## 2024-11-18 VITALS
WEIGHT: 157 LBS | DIASTOLIC BLOOD PRESSURE: 74 MMHG | HEIGHT: 62 IN | BODY MASS INDEX: 28.89 KG/M2 | TEMPERATURE: 97.6 F | HEART RATE: 66 BPM | SYSTOLIC BLOOD PRESSURE: 114 MMHG

## 2024-11-18 DIAGNOSIS — K21.9 GASTROESOPHAGEAL REFLUX DISEASE, UNSPECIFIED WHETHER ESOPHAGITIS PRESENT: ICD-10-CM

## 2024-11-18 DIAGNOSIS — Z00.00 ROUTINE ADULT HEALTH MAINTENANCE: Primary | ICD-10-CM

## 2024-11-18 DIAGNOSIS — E78.2 MIXED HYPERLIPIDEMIA: ICD-10-CM

## 2024-11-18 DIAGNOSIS — Z78.0 MENOPAUSE: ICD-10-CM

## 2024-11-18 DIAGNOSIS — R93.1 ABNORMAL SCREENING CARDIAC CT: ICD-10-CM

## 2024-11-18 DIAGNOSIS — Z85.3 H/O MALIGNANT NEOPLASM OF BREAST: ICD-10-CM

## 2024-11-18 DIAGNOSIS — Z85.44 HISTORY OF MALIGNANT NEOPLASM OF FALLOPIAN TUBE IN ADULTHOOD: ICD-10-CM

## 2024-11-18 DIAGNOSIS — I71.21 ANEURYSM OF ASCENDING AORTA WITHOUT RUPTURE (CMS-HCC): ICD-10-CM

## 2024-11-18 DIAGNOSIS — E03.9 ACQUIRED HYPOTHYROIDISM: ICD-10-CM

## 2024-11-18 DIAGNOSIS — M54.6 THORACIC BACK PAIN, UNSPECIFIED BACK PAIN LATERALITY, UNSPECIFIED CHRONICITY: ICD-10-CM

## 2024-11-18 DIAGNOSIS — Z13.820 SCREENING FOR OSTEOPOROSIS: ICD-10-CM

## 2024-11-18 DIAGNOSIS — M47.816 SPONDYLOSIS OF LUMBAR SPINE: ICD-10-CM

## 2024-11-18 DIAGNOSIS — E55.9 VITAMIN D DEFICIENCY: ICD-10-CM

## 2024-11-18 DIAGNOSIS — R68.82 LOW LIBIDO: ICD-10-CM

## 2024-11-18 PROBLEM — N28.1 KIDNEY CYST, ACQUIRED: Status: RESOLVED | Noted: 2023-06-05 | Resolved: 2024-11-18

## 2024-11-18 PROBLEM — M16.12 OSTEOARTHRITIS OF LEFT HIP: Status: RESOLVED | Noted: 2023-04-12 | Resolved: 2024-11-18

## 2024-11-18 PROBLEM — R31.21 ASYMPTOMATIC MICROSCOPIC HEMATURIA: Status: RESOLVED | Noted: 2023-04-12 | Resolved: 2024-11-18

## 2024-11-18 PROBLEM — M43.00 SPONDYLOLYSIS: Status: RESOLVED | Noted: 2023-04-12 | Resolved: 2024-11-18

## 2024-11-18 PROCEDURE — 3008F BODY MASS INDEX DOCD: CPT | Performed by: INTERNAL MEDICINE

## 2024-11-18 PROCEDURE — 99396 PREV VISIT EST AGE 40-64: CPT | Performed by: INTERNAL MEDICINE

## 2024-11-18 PROCEDURE — 1036F TOBACCO NON-USER: CPT | Performed by: INTERNAL MEDICINE

## 2024-11-18 RX ORDER — DEXAMETHASONE 1 MG/1
1 TABLET ORAL ONCE
Qty: 1 TABLET | Refills: 0 | Status: SHIPPED | OUTPATIENT
Start: 2024-11-18 | End: 2024-11-18

## 2024-11-18 RX ORDER — AMOXICILLIN 500 MG/1
CAPSULE ORAL
Qty: 6 CAPSULE | Refills: 3 | Status: SHIPPED | OUTPATIENT
Start: 2024-11-18

## 2024-11-18 RX ORDER — NITROFURANTOIN 25; 75 MG/1; MG/1
100 CAPSULE ORAL EVERY 12 HOURS SCHEDULED
COMMUNITY
Start: 2022-11-17

## 2024-11-18 RX ORDER — HYDROGEN PEROXIDE 3 %
20 SOLUTION, NON-ORAL MISCELLANEOUS
Start: 2024-11-18 | End: 2025-11-18

## 2024-11-18 ASSESSMENT — PATIENT HEALTH QUESTIONNAIRE - PHQ9
SUM OF ALL RESPONSES TO PHQ9 QUESTIONS 1 AND 2: 0
1. LITTLE INTEREST OR PLEASURE IN DOING THINGS: NOT AT ALL
2. FEELING DOWN, DEPRESSED OR HOPELESS: NOT AT ALL

## 2024-11-18 NOTE — PROGRESS NOTES
ANNUAL CPE VISIT  Chief Complaint   Patient presents with    Annual Exam     HPI: Still has dull ache over her left back  US 2/24: Unremarkable renal ultrasound.   XR thoracic spine: Normal radiographs of the thoracic spine   No worse  Takes Mobic daily  Started Vit D last year (level was 20)  Started Tirzepatide, lost 16 lbs    Has no sex drive  Has facial hair , tweezed them off      Assessment and Plan:  Problem List Items Addressed This Visit          High    Routine adult health maintenance - Primary    Overview     Influenza 10/1/20, declined further vaccine  TDAP 8/14/2020  Shingrix 10/16/23, 2/16/24  Mamm 8/28/18; 5/21/20, s/p bilateral mastectomies  s/p TAHBSO age [age]  BMD 4/7/16, 5/19/20  Cscope 12/21/14 (5yrs); 12/20 (5yrs)  MMR 3/13/19   2017 (8)         Current Assessment & Plan     Annual Wellness exam completed   Preventive Health History reviewed  Ordered:   Labs    BMD            Relevant Orders    Comprehensive Metabolic Panel    CBC and Auto Differential    Lipid Panel    Urinalysis with Reflex Microscopic    Magnesium       Medium    Abnormal screening cardiac CT    Overview     5/23: 1. Coronary artery calcium score of 33.78*.   2. 4.1 cm ascending aortic aneurysm.   3. 2.7 cm probable cyst in the dome of the liver incidentally noted         Aneurysm of ascending aorta without rupture (CMS-HCC)    Overview     5/23: CT calcium score: 4.1 cm ascending aortic aneurysm.         Current Assessment & Plan     ECHO to assess size         Relevant Orders    Transthoracic Echo (TTE) Complete    BMI 29.0-29.9,adult    Overview     Started Tirzepatitide 8/24  Start kraus was 170lbs         Relevant Medications    dexAMETHasone (Decadron) 1 mg tablet    Other Relevant Orders    Cortisol    Dexamethasone    GERD (gastroesophageal reflux disease)    Overview     On PPI         Relevant Medications    esomeprazole (NexIUM) 20 mg DR capsule    H/O malignant neoplasm of breast    Overview     Dx 5/2020;  intermediate to high grade solid and cribriform pattern with central necrosis and calcification and a single focus suspicious for microinvasion. ER 95% MN 1%.  Genetic testing revewaled pathogenic BRCA1 mutation, and two variants of uncertain significance in the TRUDY and BRIP1 genes.  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 Coronary artery calcium score of 33.78  Managed with D&E  Started GLP1/GIP in 8 /24  Pt is medication averse         Screening for osteoporosis    Relevant Orders    XR DEXA bone density    Spondylosis of lumbar spine    Overview     MRI 4/7/16: Impression:1. Bilateral pars interarticularis defects as well as grade Ispondylolisthesis at the L5-S1 level2. Shallow central disc herniation at the L4-5 level.;    XR 9/21: Mild anterolisthesis L5 on S1 with findings concerning for spondylolysis at L5 bilaterally; On Mobic         Vitamin D deficiency    Overview     10/23: 20  Goal 40-50  started on supplement          Relevant Orders    Vitamin D 25-Hydroxy,Total (for eval of Vitamin D levels)     Other Visit Diagnoses       Low libido        Would try Scream cream first  Consider toical Testosterone    Relevant Orders    Testosterone, total and free    DHEA-Sulfate    Estradiol    Cortisol    Thoracic back pain, unspecified back pain laterality,  "unspecified chronicity        will start with PT, Meliisa Rodkey              ROS otherwise negative aside from what was mentioned above in HPI.    Vitals  /74   Pulse 66   Temp 36.4 °C (97.6 °F)   Ht 1.562 m (5' 1.5\")   Wt 71.2 kg (157 lb)   BMI 29.18 kg/m²   Body mass index is 29.18 kg/m².  Physical Exam  Gen: Alert, NAD  HEENT:  Normal exam  Neck:  No masses/nodes palpable; Thyroid nontender and without nodules; No DAFNE  Respiratory:  Lungs CTAB  CV: RRR  Neuro:  Gross motor and sensory intact  Skin:  No suspicious lesions present  Breast: no lymphadenopathy  BacK : Tender over the left paraspinal muscle thoracic region  No SIJ pain  LLE longer than right    Allergies and Medications  Patient has no known allergies.  Current Outpatient Medications   Medication Instructions    cholecalciferol (Vitamin D-3) 50 mcg (2,000 unit) capsule Take by mouth.    dexAMETHasone (DECADRON) 1 mg, oral, Once, Take at 11pm the night before the cortisol lab test    esomeprazole (NEXIUM) 20 mg, oral, Daily before breakfast, Do not open capsule.    magnesium gluconate (Magonate) 27.5 mg magne- sium (500 mg) tablet Daily    meloxicam (MOBIC) 7.5 mg, oral, 2 times daily PRN    nitrofurantoin, macrocrystal-monohydrate, (Macrobid) 100 mg capsule 100 mg, Every 12 hours scheduled    phenazopyridine (Pyridium) 100 mg tablet 1 tablet, As needed    Synthroid 137 mcg tablet TAKE 1 TABLET BY MOUTH EVERY DAY 6 DAYS A WEEK    tirzepatide 5 mg, subcutaneous, Every 7 days, Get this from [employer] in [place]    tiZANidine (ZANAFLEX) 4 mg, oral, Every 8 hours PRN       Active Problem List  Patient Active Problem List   Diagnosis    BRCA1 positive    Chronic insomnia    Diverticulosis of colon    GERD (gastroesophageal reflux disease)    Grade I hemorrhoids    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    H/O malignant neoplasm of breast    History of colon polyps    Mixed hyperlipidemia    Hypothyroidism    Spondylosis " of lumbar spine    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 Coronary artery calcium score of 33.78*. 2. 4.1 cm ascending aortic aneurysm. 3. 2.7 cm probable cyst in the dome of the liver incidentally noted    H/O bone density study     16: normal : normal    H/O CT scan of abdomen     : The right hepatic lobe demonstrates a cyst measuring 4.6 cm, unchanged from multiple prior studies including CT abdomen from 2009. There are hypoattenuating lesions within both kidney cortices, most compatible with cysts stable from multiple prior studies, largest within upper pole right kidney measuring 2.0 cm. There is a hypoattenuating lesion measuring 0.7 cm in the lower left kidney.    H/O diagnostic ultrasound 2024    US kidney: Unremarkable renal ultrasound.    H/O magnetic resonance imaging of lumbar spine     16: Impression: 1.  Bilateral pars interarticularis defects as well as grade I spondylolisthesis at the L5-S1 level 2.  Shallow central disc herniation at the L4-5 level.    H/O x-ray of hip     XR hip :Mild osteoarthrosis of the left hip    H/O x-ray of lumbar spine     : Mild anterolisthesis L5 on S1 with findings concerning for spondylolysis at L5 bilaterally     Past Surgical History:   Procedure Laterality Date    BLADDER SURGERY  2021    Lift, CARRIE     SECTION, CLASSIC  2013     Section     SECTION, CLASSIC  2013     Section    COLONOSCOPY  2020; - External and internal hemorrhoids.                      - Diverticulosis in the sigmoid colon.                     - One 7 mm polyp in the ascending colon, removed with a                     cold snare. Resected and retrieved 14 : Impression:          - The examined portion of the ileum was normal.                      - External hemorrhoids. One 5 mm polyp in the cecum.    ESOPHAGOGASTRODUODENOSCOPY  2020: Impression:         - LA Grade A reflux esophagitis.                                       - No endoscopic esophageal abnormality to explain                                       patient's dysphagia. Biopsied.                     - Non-bleeding erosive gastropathy    HYSTERECTOMY  2020    s/p TAHBSO    LYMPHADENECTOMY  2020    Pelvic LNs    OOPHORECTOMY  2013    Oophorectomy    OTHER SURGICAL HISTORY  2013    Resection Of Ovarian Malig. + BSO, Omentectomy, ABDIRASHID, LN Bx    OTHER SURGICAL HISTORY  2020    Abdominoplasty    OTHER SURGICAL HISTORY  2020    Mastectomy bilateral    TOTAL HIP ARTHROPLASTY Left 2024       Social History     Social History Narrative    Social History:    , 4 kids    RN, [employer] and [employer] in [place]    Former Smoker: Smoked 8 years, 1/2ppd, Quit     Social ETOH    ---    Family History:       M: CAD age 60s/CABG, Ovarian CA age 50s        F: Esophageal CA age 50s, HTN ( age 60s)        S: Breast CA x 2 age 40s (BRCA (-)        S: Downs Syndrome, Ovarian CA with peritoneal carcinomatosis ( age 57)        B: CAD        B:  drug overdose

## 2024-11-19 ENCOUNTER — LAB (OUTPATIENT)
Dept: LAB | Facility: LAB | Age: 54
End: 2024-11-19
Payer: COMMERCIAL

## 2024-11-19 DIAGNOSIS — R68.82 LOW LIBIDO: ICD-10-CM

## 2024-11-19 DIAGNOSIS — E03.9 ACQUIRED HYPOTHYROIDISM: ICD-10-CM

## 2024-11-19 DIAGNOSIS — Z00.00 ROUTINE ADULT HEALTH MAINTENANCE: ICD-10-CM

## 2024-11-19 DIAGNOSIS — E55.9 VITAMIN D DEFICIENCY: ICD-10-CM

## 2024-11-19 LAB
25(OH)D3 SERPL-MCNC: 37 NG/ML (ref 30–100)
ALBUMIN SERPL BCP-MCNC: 4.5 G/DL (ref 3.4–5)
ALP SERPL-CCNC: 73 U/L (ref 33–110)
ALT SERPL W P-5'-P-CCNC: 12 U/L (ref 7–45)
ANION GAP SERPL CALC-SCNC: 13 MMOL/L (ref 10–20)
APPEARANCE UR: CLEAR
AST SERPL W P-5'-P-CCNC: 14 U/L (ref 9–39)
BASOPHILS # BLD AUTO: 0.03 X10*3/UL (ref 0–0.1)
BASOPHILS NFR BLD AUTO: 0.5 %
BILIRUB SERPL-MCNC: 0.4 MG/DL (ref 0–1.2)
BILIRUB UR STRIP.AUTO-MCNC: NEGATIVE MG/DL
BUN SERPL-MCNC: 14 MG/DL (ref 6–23)
CALCIUM SERPL-MCNC: 9.4 MG/DL (ref 8.6–10.3)
CHLORIDE SERPL-SCNC: 104 MMOL/L (ref 98–107)
CHOLEST SERPL-MCNC: 265 MG/DL (ref 0–199)
CHOLESTEROL/HDL RATIO: 4.5
CO2 SERPL-SCNC: 26 MMOL/L (ref 21–32)
COLOR UR: COLORLESS
CORTIS SERPL-MCNC: 1.3 UG/DL (ref 2.5–20)
CREAT SERPL-MCNC: 0.75 MG/DL (ref 0.5–1.05)
DHEA-S SERPL-MCNC: 159 UG/DL (ref 30–260)
EGFRCR SERPLBLD CKD-EPI 2021: >90 ML/MIN/1.73M*2
EOSINOPHIL # BLD AUTO: 0.02 X10*3/UL (ref 0–0.7)
EOSINOPHIL NFR BLD AUTO: 0.3 %
ERYTHROCYTE [DISTWIDTH] IN BLOOD BY AUTOMATED COUNT: 13.1 % (ref 11.5–14.5)
ESTRADIOL SERPL-MCNC: <19 PG/ML
GLUCOSE SERPL-MCNC: 96 MG/DL (ref 74–99)
GLUCOSE UR STRIP.AUTO-MCNC: NORMAL MG/DL
HCT VFR BLD AUTO: 41.5 % (ref 36–46)
HDLC SERPL-MCNC: 59.1 MG/DL
HGB BLD-MCNC: 12.9 G/DL (ref 12–16)
IMM GRANULOCYTES # BLD AUTO: 0.01 X10*3/UL (ref 0–0.7)
IMM GRANULOCYTES NFR BLD AUTO: 0.2 % (ref 0–0.9)
KETONES UR STRIP.AUTO-MCNC: NEGATIVE MG/DL
LDLC SERPL CALC-MCNC: 189 MG/DL
LEUKOCYTE ESTERASE UR QL STRIP.AUTO: NEGATIVE
LYMPHOCYTES # BLD AUTO: 1.2 X10*3/UL (ref 1.2–4.8)
LYMPHOCYTES NFR BLD AUTO: 19.2 %
MAGNESIUM SERPL-MCNC: 1.93 MG/DL (ref 1.6–2.4)
MCH RBC QN AUTO: 29.5 PG (ref 26–34)
MCHC RBC AUTO-ENTMCNC: 31.1 G/DL (ref 32–36)
MCV RBC AUTO: 95 FL (ref 80–100)
MONOCYTES # BLD AUTO: 0.29 X10*3/UL (ref 0.1–1)
MONOCYTES NFR BLD AUTO: 4.6 %
NEUTROPHILS # BLD AUTO: 4.71 X10*3/UL (ref 1.2–7.7)
NEUTROPHILS NFR BLD AUTO: 75.2 %
NITRITE UR QL STRIP.AUTO: NEGATIVE
NON HDL CHOLESTEROL: 206 MG/DL (ref 0–149)
NRBC BLD-RTO: 0 /100 WBCS (ref 0–0)
PH UR STRIP.AUTO: 6.5 [PH]
PLATELET # BLD AUTO: 216 X10*3/UL (ref 150–450)
POTASSIUM SERPL-SCNC: 5 MMOL/L (ref 3.5–5.3)
PROT SERPL-MCNC: 7.2 G/DL (ref 6.4–8.2)
PROT UR STRIP.AUTO-MCNC: NEGATIVE MG/DL
RBC # BLD AUTO: 4.37 X10*6/UL (ref 4–5.2)
RBC # UR STRIP.AUTO: ABNORMAL /UL
RBC #/AREA URNS AUTO: NORMAL /HPF
SODIUM SERPL-SCNC: 138 MMOL/L (ref 136–145)
SP GR UR STRIP.AUTO: 1.01
TRIGL SERPL-MCNC: 84 MG/DL (ref 0–149)
TSH SERPL-ACNC: 3.81 MIU/L (ref 0.44–3.98)
UROBILINOGEN UR STRIP.AUTO-MCNC: NORMAL MG/DL
VLDL: 17 MG/DL (ref 0–40)
WBC # BLD AUTO: 6.3 X10*3/UL (ref 4.4–11.3)
WBC #/AREA URNS AUTO: NORMAL /HPF

## 2024-11-19 PROCEDURE — 83735 ASSAY OF MAGNESIUM: CPT

## 2024-11-19 PROCEDURE — 82627 DEHYDROEPIANDROSTERONE: CPT

## 2024-11-19 PROCEDURE — 82670 ASSAY OF TOTAL ESTRADIOL: CPT

## 2024-11-19 PROCEDURE — 80375 DRUG/SUBSTANCE NOS 1-3: CPT

## 2024-11-19 PROCEDURE — 85025 COMPLETE CBC W/AUTO DIFF WBC: CPT

## 2024-11-19 PROCEDURE — 84443 ASSAY THYROID STIM HORMONE: CPT

## 2024-11-19 PROCEDURE — 80061 LIPID PANEL: CPT

## 2024-11-19 PROCEDURE — 84402 ASSAY OF FREE TESTOSTERONE: CPT

## 2024-11-19 PROCEDURE — 80053 COMPREHEN METABOLIC PANEL: CPT

## 2024-11-19 PROCEDURE — 82306 VITAMIN D 25 HYDROXY: CPT

## 2024-11-19 PROCEDURE — 81001 URINALYSIS AUTO W/SCOPE: CPT

## 2024-11-19 PROCEDURE — 36415 COLL VENOUS BLD VENIPUNCTURE: CPT

## 2024-11-19 PROCEDURE — 82533 TOTAL CORTISOL: CPT

## 2024-11-23 DIAGNOSIS — E03.9 HYPOTHYROIDISM, UNSPECIFIED TYPE: ICD-10-CM

## 2024-11-23 LAB — DEXAMETHASONE SERPL-MCNC: 424 NG/DL

## 2024-11-23 RX ORDER — LEVOTHYROXINE SODIUM 137 UG/1
TABLET ORAL
Qty: 90 TABLET | Refills: 3 | Status: SHIPPED | OUTPATIENT
Start: 2024-11-23

## 2024-11-27 LAB
TESTOSTERONE FREE (CHAN): 0.8 PG/ML (ref 0.1–6.4)
TESTOSTERONE,TOTAL,LC-MS/MS: 6 NG/DL (ref 2–45)

## 2024-12-27 ENCOUNTER — OFFICE VISIT (OUTPATIENT)
Dept: URGENT CARE | Age: 54
End: 2024-12-27
Payer: COMMERCIAL

## 2024-12-27 VITALS
DIASTOLIC BLOOD PRESSURE: 78 MMHG | HEART RATE: 79 BPM | RESPIRATION RATE: 16 BRPM | SYSTOLIC BLOOD PRESSURE: 132 MMHG | WEIGHT: 155 LBS | TEMPERATURE: 97.8 F | BODY MASS INDEX: 29.27 KG/M2 | HEIGHT: 61 IN | OXYGEN SATURATION: 97 %

## 2024-12-27 DIAGNOSIS — J02.9 SORE THROAT: Primary | ICD-10-CM

## 2024-12-27 LAB — POC RAPID STREP: NEGATIVE

## 2024-12-27 PROCEDURE — 99203 OFFICE O/P NEW LOW 30 MIN: CPT | Performed by: FAMILY MEDICINE

## 2024-12-27 PROCEDURE — 87880 STREP A ASSAY W/OPTIC: CPT | Performed by: FAMILY MEDICINE

## 2024-12-27 PROCEDURE — 3008F BODY MASS INDEX DOCD: CPT | Performed by: FAMILY MEDICINE

## 2024-12-27 PROCEDURE — 1036F TOBACCO NON-USER: CPT | Performed by: FAMILY MEDICINE

## 2024-12-27 ASSESSMENT — ENCOUNTER SYMPTOMS
SINUS PAIN: 0
COUGH: 0
CHEST TIGHTNESS: 0
CHILLS: 1
SORE THROAT: 1
EYE DISCHARGE: 0
WHEEZING: 0
FEVER: 0
EYE REDNESS: 0
EYE PAIN: 0
SHORTNESS OF BREATH: 0

## 2024-12-27 ASSESSMENT — PAIN SCALES - GENERAL: PAINLEVEL_OUTOF10: 8

## 2024-12-27 NOTE — PROGRESS NOTES
Subjective   Patient ID: Sherron Valdez is a 54 y.o. female. They present today with a chief complaint of Sore Throat (X 2 days).    History of Present Illness    History provided by:  Patient   used: No    Sore Throat   This is a new problem. The current episode started in the past 7 days (12/24/24). The problem has been unchanged. There has been no fever. The pain is at a severity of 8/10. The pain is severe. Pertinent negatives include no coughing, ear pain or shortness of breath. She has had exposure to strep. She has tried nothing for the symptoms.       Past Medical History  Allergies as of 12/27/2024    (No Known Allergies)       (Not in a hospital admission)       Past Medical History:   Diagnosis Date    Abnormal screening cardiac CT     5/23: 1. Coronary artery calcium score of 33.78*. 2. 4.1 cm ascending aortic aneurysm. 3. 2.7 cm probable cyst in the dome of the liver incidentally noted    H/O bone density study     4/7/16: normal 5/20: normal    H/O CT scan of abdomen     2011: The right hepatic lobe demonstrates a cyst measuring 4.6 cm, unchanged from multiple prior studies including CT abdomen from 714 2009. There are hypoattenuating lesions within both kidney cortices, most compatible with cysts stable from multiple prior studies, largest within upper pole right kidney measuring 2.0 cm. There is a hypoattenuating lesion measuring 0.7 cm in the lower left kidney.    H/O diagnostic ultrasound 02/2024    US kidney: Unremarkable renal ultrasound.    H/O magnetic resonance imaging of lumbar spine     4/7/16: Impression: 1.  Bilateral pars interarticularis defects as well as grade I spondylolisthesis at the L5-S1 level 2.  Shallow central disc herniation at the L4-5 level.    H/O x-ray of hip     XR hip 2015:Mild osteoarthrosis of the left hip    H/O x-ray of lumbar spine     9/21: Mild anterolisthesis L5 on S1 with findings concerning for spondylolysis at L5 bilaterally       Past  Surgical History:   Procedure Laterality Date    BLADDER SURGERY  2021    Lift, CARRIE     SECTION, CLASSIC  2013     Section     SECTION, CLASSIC  2013     Section    COLONOSCOPY  2020; - External and internal hemorrhoids.                     - Diverticulosis in the sigmoid colon.                     - One 7 mm polyp in the ascending colon, removed with a                     cold snare. Resected and retrieved 14 : Impression:          - The examined portion of the ileum was normal.                      - External hemorrhoids. One 5 mm polyp in the cecum.    ESOPHAGOGASTRODUODENOSCOPY  2020: Impression:         - LA Grade A reflux esophagitis.                                       - No endoscopic esophageal abnormality to explain                                       patient's dysphagia. Biopsied.                     - Non-bleeding erosive gastropathy    HYSTERECTOMY  2020    s/p TAHBSO    LYMPHADENECTOMY  2020    Pelvic LNs    OOPHORECTOMY  2013    Oophorectomy    OTHER SURGICAL HISTORY  2013    Resection Of Ovarian Malig. + BSO, Omentectomy, ABDIRASHID, LN Bx    OTHER SURGICAL HISTORY  2020    Abdominoplasty    OTHER SURGICAL HISTORY  2020    Mastectomy bilateral    TOTAL HIP ARTHROPLASTY Left 2024        reports that she has quit smoking. Her smoking use included cigarettes. She has never used smokeless tobacco. She reports current alcohol use. She reports that she does not use drugs.    Review of Systems  Review of Systems   Constitutional:  Positive for chills. Negative for fever.   HENT:  Positive for sore throat. Negative for ear pain and sinus pain.    Eyes:  Positive for visual disturbance. Negative for pain, discharge and redness.   Respiratory:  Negative for cough, chest tightness, shortness of breath and wheezing.    Cardiovascular:  Negative for chest pain.                             "      Objective    Vitals:    12/27/24 1331   BP: 132/78   BP Location: Left arm   Patient Position: Sitting   BP Cuff Size: Adult   Pulse: 79   Resp: 16   Temp: 36.6 °C (97.8 °F)   TempSrc: Oral   SpO2: 97%   Weight: 70.3 kg (155 lb)   Height: 1.549 m (5' 1\")     No LMP recorded.    Physical Exam  Vitals reviewed.   Constitutional:       General: She is not in acute distress.     Appearance: She is not ill-appearing.   HENT:      Right Ear: Tympanic membrane and ear canal normal.      Left Ear: Tympanic membrane and ear canal normal.      Nose: Nose normal.      Right Sinus: No maxillary sinus tenderness or frontal sinus tenderness.      Left Sinus: No maxillary sinus tenderness or frontal sinus tenderness.      Mouth/Throat:      Mouth: Mucous membranes are moist.      Pharynx: No posterior oropharyngeal erythema.   Eyes:      Extraocular Movements: Extraocular movements intact.      Conjunctiva/sclera: Conjunctivae normal.      Pupils: Pupils are equal, round, and reactive to light.   Cardiovascular:      Rate and Rhythm: Normal rate and regular rhythm.      Heart sounds: No murmur heard.     No friction rub.   Pulmonary:      Effort: Pulmonary effort is normal. No respiratory distress.      Breath sounds: No wheezing, rhonchi or rales.   Lymphadenopathy:      Cervical: No cervical adenopathy.   Neurological:      Mental Status: She is alert.         Procedures    Point of Care Test & Imaging Results from this visit  Results for orders placed or performed in visit on 12/27/24   POCT rapid strep A manually resulted   Result Value Ref Range    POC Rapid Strep Negative Negative      No results found.    Diagnostic study results (if any) were reviewed by Delmer Almonte DO.    Assessment/Plan   Allergies, medications, history, and pertinent labs/EKGs/Imaging reviewed by Delmer Almonte DO.     Orders and Diagnoses  Diagnoses and all orders for this visit:  Sore throat  -     POCT rapid strep A manually " resulted      Medical Admin Record      Patient disposition: Home    Electronically signed by Delmer Almonte DO  1:59 PM

## 2025-01-28 DIAGNOSIS — K21.9 GASTROESOPHAGEAL REFLUX DISEASE, UNSPECIFIED WHETHER ESOPHAGITIS PRESENT: ICD-10-CM

## 2025-01-28 DIAGNOSIS — M16.12 PRIMARY OSTEOARTHRITIS OF LEFT HIP: ICD-10-CM

## 2025-01-28 DIAGNOSIS — M47.816 LUMBAR SPONDYLOSIS: ICD-10-CM

## 2025-01-28 DIAGNOSIS — E03.9 HYPOTHYROIDISM, UNSPECIFIED TYPE: ICD-10-CM

## 2025-01-28 DIAGNOSIS — Z12.11 SCREENING FOR COLON CANCER: ICD-10-CM

## 2025-01-28 RX ORDER — MELOXICAM 7.5 MG/1
7.5 TABLET ORAL 2 TIMES DAILY PRN
Qty: 30 TABLET | Refills: 1 | Status: SHIPPED | OUTPATIENT
Start: 2025-01-28 | End: 2026-01-28

## 2025-01-28 RX ORDER — LEVOTHYROXINE SODIUM 137 UG/1
TABLET ORAL
Qty: 90 TABLET | Refills: 3 | Status: SHIPPED | OUTPATIENT
Start: 2025-01-28

## 2025-03-25 ENCOUNTER — APPOINTMENT (OUTPATIENT)
Dept: PRIMARY CARE | Facility: CLINIC | Age: 55
End: 2025-03-25
Payer: COMMERCIAL

## 2025-03-25 VITALS
SYSTOLIC BLOOD PRESSURE: 104 MMHG | BODY MASS INDEX: 28.13 KG/M2 | HEIGHT: 61 IN | HEART RATE: 69 BPM | TEMPERATURE: 98.2 F | DIASTOLIC BLOOD PRESSURE: 63 MMHG | WEIGHT: 149 LBS | OXYGEN SATURATION: 95 %

## 2025-03-25 DIAGNOSIS — J34.89 RHINORRHEA: ICD-10-CM

## 2025-03-25 DIAGNOSIS — R20.0 RIGHT FACIAL NUMBNESS: Primary | ICD-10-CM

## 2025-03-25 DIAGNOSIS — H53.001 LAZY EYE, RIGHT: ICD-10-CM

## 2025-03-25 DIAGNOSIS — Z85.44 HISTORY OF MALIGNANT NEOPLASM OF FALLOPIAN TUBE IN ADULTHOOD: ICD-10-CM

## 2025-03-25 DIAGNOSIS — Z85.3 H/O MALIGNANT NEOPLASM OF BREAST: ICD-10-CM

## 2025-03-25 DIAGNOSIS — H53.10: ICD-10-CM

## 2025-03-25 DIAGNOSIS — H53.9 VISION CHANGES: ICD-10-CM

## 2025-03-25 PROCEDURE — 3008F BODY MASS INDEX DOCD: CPT | Performed by: NURSE PRACTITIONER

## 2025-03-25 PROCEDURE — 1036F TOBACCO NON-USER: CPT | Performed by: NURSE PRACTITIONER

## 2025-03-25 PROCEDURE — 99215 OFFICE O/P EST HI 40 MIN: CPT | Performed by: NURSE PRACTITIONER

## 2025-03-25 RX ORDER — METHYLPREDNISOLONE 4 MG/1
TABLET ORAL
Qty: 21 TABLET | Refills: 0 | Status: SHIPPED | OUTPATIENT
Start: 2025-03-25 | End: 2025-03-31

## 2025-03-25 RX ORDER — AMOXICILLIN AND CLAVULANATE POTASSIUM 875; 125 MG/1; MG/1
875 TABLET, FILM COATED ORAL 2 TIMES DAILY
Qty: 14 TABLET | Refills: 0 | Status: SHIPPED | OUTPATIENT
Start: 2025-03-25 | End: 2025-04-01

## 2025-03-25 NOTE — ASSESSMENT & PLAN NOTE
X3 weeks with HA, tearing of right eye, right rhinorrhea, right facial paresthesia, pressure behind right eye/ear/tonsil, vision changes  H/O fallopian and breast cancer  DDX include:  CFS leak, Cluster migraine, Trigeminal neuralgia, brain mass/lesion, sinusitis  Will get MRI w/wo contrast  Start treatment for a simple sinusitis with augmentin and medrol  If worsening, ED evaluation  May need neurology imput

## 2025-03-25 NOTE — PROGRESS NOTES
Subjective   Patient ID: Sherron Valdez is a 54 y.o. female who presents for Earache.    3weeks ago  Started with HA, started over right eye  Severe, lasted one day  - no hx of HA or migraines in past  Tearing in right eye, with dry eye  Right side of face paraesthesia  Right face/behind eye, ear and tonsil pressure/pain  More strained vision, possible cloudiness  No blurred, double, spots, halos  No pain with eye movement  Feels like nerve pain from temporal lobe down jaw line   Almost a mild numbness  Constant clear trickle out of right nare  Does not taste sweet or salty or metallic  Was taking tylenol sinus with decongestant  - did that for 5 days  Then Flonase  - 3 days          Review of Systems  ROS completely negative except what was mentioned in the HPI.  Problem List, surgical, social, and family histories which were reviewed and updated as necessary within the EMR. I also personally reviewed the notes, labs, and imaging that pertained to what was documented or discussed in the HPI.    Objective   Physical Exam  Constitutional:       Appearance: Normal appearance.   HENT:      Head: Normocephalic.      Right Ear: Tympanic membrane, ear canal and external ear normal.      Left Ear: Tympanic membrane, ear canal and external ear normal.      Nose: Nose normal.      Mouth/Throat:      Mouth: Mucous membranes are moist.      Pharynx: Oropharynx is clear.   Eyes:      General: Lids are normal. Gaze aligned appropriately.      Extraocular Movements: Extraocular movements intact.      Conjunctiva/sclera: Conjunctivae normal.      Pupils: Pupils are equal, round, and reactive to light.      Comments: Delay of right eye on divergence   Cardiovascular:      Rate and Rhythm: Normal rate and regular rhythm.      Pulses: Normal pulses.      Heart sounds: Normal heart sounds.   Pulmonary:      Effort: Pulmonary effort is normal.      Breath sounds: Normal breath sounds.   Musculoskeletal:         General: Normal range of  "motion.      Cervical back: Normal range of motion and neck supple. No rigidity or tenderness.   Lymphadenopathy:      Cervical: No cervical adenopathy.   Skin:     General: Skin is warm and dry.      Capillary Refill: Capillary refill takes less than 2 seconds.   Neurological:      General: No focal deficit present.      Mental Status: She is alert.      Cranial Nerves: Cranial nerves 2-12 are intact.      Sensory: Sensory deficit present.      Motor: Motor function is intact.      Coordination: Coordination is intact.      Gait: Gait is intact.         /63   Pulse 69   Temp 36.8 °C (98.2 °F) (Temporal)   Ht 1.549 m (5' 1\")   Wt 67.6 kg (149 lb)   SpO2 95%   BMI 28.15 kg/m²     Assessment/Plan    Problem List Items Addressed This Visit       H/O malignant neoplasm of breast    Overview     Dx 5/2020; intermediate to high grade solid and cribriform pattern with central necrosis and calcification and a single focus suspicious for microinvasion. ER 95% NV 1%.  Genetic testing revewaled pathogenic BRCA1 mutation, and two variants of uncertain significance in the TRUDY and BRIP1 genes.  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  s/p debulking surgery and adjuvant chemotherapy with Taxol and Carboplatin;           Relevant Orders    MR brain w and wo IV contrast    Right facial numbness - Primary    Current Assessment & Plan     X3 weeks with HA, tearing of right eye, right rhinorrhea, right facial paresthesia, pressure behind right eye/ear/tonsil, vision changes  H/O fallopian and breast cancer  DDX include:  CFS leak, Cluster migraine, Trigeminal neuralgia, brain mass/lesion, sinusitis  Will get MRI w/wo contrast  Start treatment for a simple sinusitis with augmentin and medrol  If worsening, ED evaluation  May need neurology " imput         Relevant Medications    methylPREDNISolone (Medrol Dospak) 4 mg tablets    amoxicillin-pot clavulanate (Augmentin) 875-125 mg tablet    Other Relevant Orders    MR brain w and wo IV contrast     Other Visit Diagnoses       Lazy eye, right        Relevant Medications    methylPREDNISolone (Medrol Dospak) 4 mg tablets    amoxicillin-pot clavulanate (Augmentin) 875-125 mg tablet    Other Relevant Orders    Referral to Ophthalmology    MR brain w and wo IV contrast    Right eye strain        Relevant Medications    methylPREDNISolone (Medrol Dospak) 4 mg tablets    amoxicillin-pot clavulanate (Augmentin) 875-125 mg tablet    Other Relevant Orders    MR brain w and wo IV contrast    Vision changes        Relevant Medications    methylPREDNISolone (Medrol Dospak) 4 mg tablets    amoxicillin-pot clavulanate (Augmentin) 875-125 mg tablet    Other Relevant Orders    MR brain w and wo IV contrast    Rhinorrhea        Relevant Medications    amoxicillin-pot clavulanate (Augmentin) 875-125 mg tablet    Other Relevant Orders    MR brain w and wo IV contrast

## 2025-04-08 ENCOUNTER — TELEPHONE (OUTPATIENT)
Dept: PRIMARY CARE | Facility: CLINIC | Age: 55
End: 2025-04-08
Payer: COMMERCIAL

## 2025-04-08 NOTE — TELEPHONE ENCOUNTER
Patient left VM asking for a refill on Nitrofuratoin and Pyridium.  I call patient back and left VM stating we can not send antibiotics in with out being seen per  policy.  I relayed if she was having UTI sx to call office and schedule appointment with NP.

## 2025-04-09 DIAGNOSIS — N30.00 ACUTE CYSTITIS WITHOUT HEMATURIA: Primary | ICD-10-CM

## 2025-04-09 RX ORDER — NITROFURANTOIN 25; 75 MG/1; MG/1
100 CAPSULE ORAL EVERY 12 HOURS SCHEDULED
Qty: 10 CAPSULE | Refills: 0 | Status: SHIPPED | OUTPATIENT
Start: 2025-04-09 | End: 2025-04-09 | Stop reason: SDUPTHER

## 2025-04-09 RX ORDER — PHENAZOPYRIDINE HYDROCHLORIDE 100 MG/1
100 TABLET, FILM COATED ORAL AS NEEDED
Qty: 10 TABLET | Refills: 3 | Status: SHIPPED | OUTPATIENT
Start: 2025-04-09

## 2025-04-09 NOTE — TELEPHONE ENCOUNTER
Spoke with patient   States she doesn't have UTI symptoms   Uses Macrobid for sexual intercourse  Aware that you sent in this for her thinks she may need more   Formatted qty 30

## 2025-04-09 NOTE — TELEPHONE ENCOUNTER
Patient left a voicemail for a refill on pyridium and macrobid    Left her a vm to call us back to get more info. Sounds like she needs an appt

## 2025-04-10 ENCOUNTER — HOSPITAL ENCOUNTER (OUTPATIENT)
Dept: RADIOLOGY | Facility: CLINIC | Age: 55
Discharge: HOME | End: 2025-04-10
Payer: COMMERCIAL

## 2025-04-10 DIAGNOSIS — Z85.44 HISTORY OF MALIGNANT NEOPLASM OF FALLOPIAN TUBE IN ADULTHOOD: ICD-10-CM

## 2025-04-10 DIAGNOSIS — J34.89 RHINORRHEA: ICD-10-CM

## 2025-04-10 DIAGNOSIS — H53.001 LAZY EYE, RIGHT: ICD-10-CM

## 2025-04-10 DIAGNOSIS — H53.10: ICD-10-CM

## 2025-04-10 DIAGNOSIS — R20.0 RIGHT FACIAL NUMBNESS: ICD-10-CM

## 2025-04-10 DIAGNOSIS — H53.9 VISION CHANGES: ICD-10-CM

## 2025-04-10 DIAGNOSIS — Z85.3 H/O MALIGNANT NEOPLASM OF BREAST: ICD-10-CM

## 2025-04-10 PROCEDURE — 70553 MRI BRAIN STEM W/O & W/DYE: CPT

## 2025-04-10 PROCEDURE — 2550000001 HC RX 255 CONTRASTS: Performed by: NURSE PRACTITIONER

## 2025-04-10 PROCEDURE — A9575 INJ GADOTERATE MEGLUMI 0.1ML: HCPCS | Performed by: NURSE PRACTITIONER

## 2025-04-10 RX ORDER — NITROFURANTOIN 25; 75 MG/1; MG/1
CAPSULE ORAL
Qty: 30 CAPSULE | Refills: 0 | Status: SHIPPED | OUTPATIENT
Start: 2025-04-10

## 2025-04-10 RX ORDER — GADOTERATE MEGLUMINE 376.9 MG/ML
14 INJECTION INTRAVENOUS
Status: COMPLETED | OUTPATIENT
Start: 2025-04-10 | End: 2025-04-10

## 2025-04-10 RX ADMIN — GADOTERATE MEGLUMINE 14 ML: 376.9 INJECTION INTRAVENOUS at 16:15

## 2025-04-29 ENCOUNTER — TELEPHONE (OUTPATIENT)
Dept: GASTROENTEROLOGY | Facility: CLINIC | Age: 55
End: 2025-04-29
Payer: COMMERCIAL

## 2025-04-29 DIAGNOSIS — Z12.11 COLON CANCER SCREENING: ICD-10-CM

## 2025-04-29 RX ORDER — SODIUM, POTASSIUM,MAG SULFATES 17.5-3.13G
1 SOLUTION, RECONSTITUTED, ORAL ORAL EVERY 12 HOURS
Qty: 2 EACH | Refills: 0 | Status: SHIPPED | OUTPATIENT
Start: 2025-04-29

## 2025-04-29 NOTE — TELEPHONE ENCOUNTER
Colonoscopy/EGD scheduled with Dr. Cline at [place] on Dec. 17, 2025.  Please send Rx for Suprep to [facility].  Patient on [address].  Instructions sent via [employer].  Thank you.

## 2025-05-01 ENCOUNTER — APPOINTMENT (OUTPATIENT)
Dept: OPHTHALMOLOGY | Facility: CLINIC | Age: 55
End: 2025-05-01
Payer: COMMERCIAL

## 2025-05-01 DIAGNOSIS — H02.88B MEIBOMIAN GLAND DISEASE OF UPPER AND LOWER EYELIDS OF BOTH EYES: ICD-10-CM

## 2025-05-01 DIAGNOSIS — H02.88A MEIBOMIAN GLAND DISEASE OF UPPER AND LOWER EYELIDS OF BOTH EYES: ICD-10-CM

## 2025-05-01 DIAGNOSIS — H04.123 DRY EYES: Primary | ICD-10-CM

## 2025-05-01 PROCEDURE — 99203 OFFICE O/P NEW LOW 30 MIN: CPT | Performed by: OPHTHALMOLOGY

## 2025-05-01 PROCEDURE — G2211 COMPLEX E/M VISIT ADD ON: HCPCS | Performed by: OPHTHALMOLOGY

## 2025-05-01 RX ORDER — TIRZEPATIDE 5 MG/.5ML
5 INJECTION, SOLUTION SUBCUTANEOUS
COMMUNITY

## 2025-05-01 ASSESSMENT — REFRACTION_MANIFEST
OS_CYLINDER: -0.50
OS_ADD: +2.25
OS_CYLINDER: -0.25
OD_CYLINDER: -0.50
OS_SPHERE: +1.75
OD_SPHERE: +2.00
OD_AXIS: 052
OD_SPHERE: +2.25
OS_SPHERE: +1.75
OD_CYLINDER: -0.50
OS_AXIS: 060
OD_ADD: +2.25
METHOD_AUTOREFRACTION: 1
OS_AXIS: 059
OD_AXIS: 050

## 2025-05-01 ASSESSMENT — REFRACTION_WEARINGRX
OS_ADD: +2.25
OD_ADD: +2.25
SPECS_TYPE: PAL
OS_CYLINDER: -0.25
OD_AXIS: 093
OD_CYLINDER: -0.50
OS_AXIS: 084
OS_SPHERE: +1.50
OD_SPHERE: +2.25

## 2025-05-01 ASSESSMENT — SLIT LAMP EXAM - LIDS
COMMENTS: 1+ MEIBOMIAN GLAND DYSFUNCTION
COMMENTS: 1+ MEIBOMIAN GLAND DYSFUNCTION

## 2025-05-01 ASSESSMENT — CONF VISUAL FIELD
OS_SUPERIOR_TEMPORAL_RESTRICTION: 0
OD_SUPERIOR_TEMPORAL_RESTRICTION: 0
OD_NORMAL: 1
OS_NORMAL: 1
OD_SUPERIOR_NASAL_RESTRICTION: 0
OD_INFERIOR_NASAL_RESTRICTION: 0
OD_INFERIOR_TEMPORAL_RESTRICTION: 0
OS_INFERIOR_NASAL_RESTRICTION: 0
OS_SUPERIOR_NASAL_RESTRICTION: 0
OS_INFERIOR_TEMPORAL_RESTRICTION: 0

## 2025-05-01 ASSESSMENT — CUP TO DISC RATIO
OD_RATIO: 0.2
OS_RATIO: 0.2

## 2025-05-01 ASSESSMENT — SCHIRMERS TEST
OD_SCHIRMERS: 08
OS_SCHIRMERS: 5

## 2025-05-01 ASSESSMENT — VISUAL ACUITY
OS_CC: 20/20
OD_CC: J5
CORRECTION_TYPE: GLASSES
OD_CC: 20/30-1
OS_CC: J1+
METHOD: SNELLEN - LINEAR

## 2025-05-01 ASSESSMENT — EXTERNAL EXAM - RIGHT EYE: OD_EXAM: NORMAL

## 2025-05-01 ASSESSMENT — EXTERNAL EXAM - LEFT EYE: OS_EXAM: NORMAL

## 2025-05-01 ASSESSMENT — ENCOUNTER SYMPTOMS: EYES NEGATIVE: 1

## 2025-05-01 NOTE — PROGRESS NOTES
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    Reports pain and tingling sensation over the right side of her face 2 months ago, had MRI done which was normal.    She has a history of shingles at the age of 15 involving one of the Thoracic branches she also receives the shingles vaccine yearly.    Impression:  Will treat as dryness, meibomian gland dysfunction (MGD)   Tasha 8/5    Plan:  ATs  Warm compresses  See in 3 months.

## 2025-06-05 DIAGNOSIS — N30.00 ACUTE CYSTITIS WITHOUT HEMATURIA: ICD-10-CM

## 2025-06-05 RX ORDER — NITROFURANTOIN 25; 75 MG/1; MG/1
CAPSULE ORAL
Qty: 30 CAPSULE | Refills: 0 | Status: SHIPPED | OUTPATIENT
Start: 2025-06-05

## 2025-09-04 ENCOUNTER — APPOINTMENT (OUTPATIENT)
Dept: OPHTHALMOLOGY | Facility: CLINIC | Age: 55
End: 2025-09-04
Payer: COMMERCIAL

## 2025-12-17 ENCOUNTER — APPOINTMENT (OUTPATIENT)
Dept: GASTROENTEROLOGY | Facility: EXTERNAL LOCATION | Age: 55
End: 2025-12-17
Payer: COMMERCIAL

## 2025-12-19 ENCOUNTER — APPOINTMENT (OUTPATIENT)
Dept: PRIMARY CARE | Facility: CLINIC | Age: 55
End: 2025-12-19
Payer: COMMERCIAL